# Patient Record
Sex: MALE | Race: WHITE | NOT HISPANIC OR LATINO | Employment: FULL TIME | URBAN - NONMETROPOLITAN AREA
[De-identification: names, ages, dates, MRNs, and addresses within clinical notes are randomized per-mention and may not be internally consistent; named-entity substitution may affect disease eponyms.]

---

## 2017-10-19 ENCOUNTER — TRANSCRIBE ORDERS (OUTPATIENT)
Dept: ADMINISTRATIVE | Facility: HOSPITAL | Age: 62
End: 2017-10-19

## 2017-10-19 ENCOUNTER — LAB (OUTPATIENT)
Dept: LAB | Facility: HOSPITAL | Age: 62
End: 2017-10-19
Attending: OPHTHALMOLOGY

## 2017-10-19 DIAGNOSIS — H47.019 ISCHEMIC OPTIC NEUROPATHY, UNSPECIFIED LATERALITY: ICD-10-CM

## 2017-10-19 DIAGNOSIS — H47.019 ISCHEMIC OPTIC NEUROPATHY, UNSPECIFIED LATERALITY: Primary | ICD-10-CM

## 2017-10-19 LAB
CRP SERPL-MCNC: <0.5 MG/DL (ref 0–0.99)
ERYTHROCYTE [SEDIMENTATION RATE] IN BLOOD: 5 MM/HR (ref 0–20)

## 2017-10-19 PROCEDURE — 86140 C-REACTIVE PROTEIN: CPT | Performed by: OPHTHALMOLOGY

## 2017-10-19 PROCEDURE — 85652 RBC SED RATE AUTOMATED: CPT | Performed by: OPHTHALMOLOGY

## 2017-10-19 PROCEDURE — 36415 COLL VENOUS BLD VENIPUNCTURE: CPT

## 2021-03-23 ENCOUNTER — IMMUNIZATION (OUTPATIENT)
Dept: VACCINE CLINIC | Facility: HOSPITAL | Age: 66
End: 2021-03-23

## 2021-03-23 PROCEDURE — 0001A: CPT | Performed by: INTERNAL MEDICINE

## 2021-03-23 PROCEDURE — 91300 HC SARSCOV02 VAC 30MCG/0.3ML IM: CPT | Performed by: INTERNAL MEDICINE

## 2021-04-13 ENCOUNTER — IMMUNIZATION (OUTPATIENT)
Dept: VACCINE CLINIC | Facility: HOSPITAL | Age: 66
End: 2021-04-13

## 2021-04-13 PROCEDURE — 91300 HC SARSCOV02 VAC 30MCG/0.3ML IM: CPT | Performed by: INTERNAL MEDICINE

## 2021-04-13 PROCEDURE — 0002A: CPT | Performed by: INTERNAL MEDICINE

## 2024-02-02 ENCOUNTER — HOSPITAL ENCOUNTER (INPATIENT)
Facility: HOSPITAL | Age: 69
LOS: 1 days | Discharge: HOME | DRG: 066 | End: 2024-02-03
Attending: INTERNAL MEDICINE | Admitting: INTERNAL MEDICINE
Payer: MEDICARE

## 2024-02-02 ENCOUNTER — APPOINTMENT (OUTPATIENT)
Dept: CT IMAGING | Facility: HOSPITAL | Age: 69
DRG: 066 | End: 2024-02-02
Payer: MEDICARE

## 2024-02-02 ENCOUNTER — HOSPITAL ENCOUNTER (EMERGENCY)
Facility: HOSPITAL | Age: 69
Discharge: OTHER INSTITUTION | DRG: 066 | End: 2024-02-02
Attending: EMERGENCY MEDICINE
Payer: MEDICARE

## 2024-02-02 ENCOUNTER — APPOINTMENT (OUTPATIENT)
Dept: GENERAL RADIOLOGY | Facility: HOSPITAL | Age: 69
DRG: 066 | End: 2024-02-02
Payer: COMMERCIAL

## 2024-02-02 VITALS
DIASTOLIC BLOOD PRESSURE: 110 MMHG | HEIGHT: 71 IN | BODY MASS INDEX: 30.1 KG/M2 | WEIGHT: 215 LBS | TEMPERATURE: 97.8 F | SYSTOLIC BLOOD PRESSURE: 172 MMHG | HEART RATE: 94 BPM | RESPIRATION RATE: 18 BRPM | OXYGEN SATURATION: 96 %

## 2024-02-02 DIAGNOSIS — I10 UNCONTROLLED HYPERTENSION: ICD-10-CM

## 2024-02-02 DIAGNOSIS — I63.9 ACUTE CVA (CEREBROVASCULAR ACCIDENT): Primary | ICD-10-CM

## 2024-02-02 PROBLEM — G45.9 TIA (TRANSIENT ISCHEMIC ATTACK): Status: ACTIVE | Noted: 2024-02-02

## 2024-02-02 LAB
ABO GROUP BLD: NORMAL
ABO GROUP BLD: NORMAL
ALBUMIN SERPL-MCNC: 4.3 G/DL (ref 3.5–5.2)
ALBUMIN/GLOB SERPL: 1.4 G/DL
ALP SERPL-CCNC: 94 U/L (ref 39–117)
ALT SERPL W P-5'-P-CCNC: 40 U/L (ref 1–41)
ANION GAP SERPL CALCULATED.3IONS-SCNC: 10.3 MMOL/L (ref 5–15)
APTT PPP: 28.1 SECONDS (ref 26.5–34.5)
AST SERPL-CCNC: 27 U/L (ref 1–40)
B PARAPERT DNA SPEC QL NAA+PROBE: NOT DETECTED
B PERT DNA SPEC QL NAA+PROBE: NOT DETECTED
BASOPHILS # BLD AUTO: 0.09 10*3/MM3 (ref 0–0.2)
BASOPHILS NFR BLD AUTO: 0.9 % (ref 0–1.5)
BILIRUB SERPL-MCNC: 0.6 MG/DL (ref 0–1.2)
BLD GP AB SCN SERPL QL: NEGATIVE
BUN SERPL-MCNC: 8 MG/DL (ref 8–23)
BUN/CREAT SERPL: 7.9 (ref 7–25)
C PNEUM DNA NPH QL NAA+NON-PROBE: NOT DETECTED
CALCIUM SPEC-SCNC: 9.4 MG/DL (ref 8.6–10.5)
CHLORIDE SERPL-SCNC: 101 MMOL/L (ref 98–107)
CO2 SERPL-SCNC: 26.7 MMOL/L (ref 22–29)
CREAT BLDA-MCNC: 1 MG/DL (ref 0.6–1.3)
CREAT SERPL-MCNC: 1.01 MG/DL (ref 0.76–1.27)
DEPRECATED RDW RBC AUTO: 45.1 FL (ref 37–54)
EGFRCR SERPLBLD CKD-EPI 2021: 82 ML/MIN/1.73
EOSINOPHIL # BLD AUTO: 0.19 10*3/MM3 (ref 0–0.4)
EOSINOPHIL NFR BLD AUTO: 1.9 % (ref 0.3–6.2)
ERYTHROCYTE [DISTWIDTH] IN BLOOD BY AUTOMATED COUNT: 13.2 % (ref 12.3–15.4)
FLUAV SUBTYP SPEC NAA+PROBE: NOT DETECTED
FLUBV RNA ISLT QL NAA+PROBE: NOT DETECTED
GLOBULIN UR ELPH-MCNC: 3 GM/DL
GLUCOSE BLDC GLUCOMTR-MCNC: 129 MG/DL (ref 70–130)
GLUCOSE BLDC GLUCOMTR-MCNC: 175 MG/DL (ref 70–130)
GLUCOSE BLDC GLUCOMTR-MCNC: 178 MG/DL (ref 70–130)
GLUCOSE SERPL-MCNC: 190 MG/DL (ref 65–99)
HADV DNA SPEC NAA+PROBE: NOT DETECTED
HCOV 229E RNA SPEC QL NAA+PROBE: NOT DETECTED
HCOV HKU1 RNA SPEC QL NAA+PROBE: NOT DETECTED
HCOV NL63 RNA SPEC QL NAA+PROBE: NOT DETECTED
HCOV OC43 RNA SPEC QL NAA+PROBE: NOT DETECTED
HCT VFR BLD AUTO: 51.9 % (ref 37.5–51)
HGB BLD-MCNC: 18.2 G/DL (ref 13–17.7)
HMPV RNA NPH QL NAA+NON-PROBE: NOT DETECTED
HOLD SPECIMEN: NORMAL
HOLD SPECIMEN: NORMAL
HPIV1 RNA ISLT QL NAA+PROBE: NOT DETECTED
HPIV2 RNA SPEC QL NAA+PROBE: NOT DETECTED
HPIV3 RNA NPH QL NAA+PROBE: NOT DETECTED
HPIV4 P GENE NPH QL NAA+PROBE: NOT DETECTED
IMM GRANULOCYTES # BLD AUTO: 0.04 10*3/MM3 (ref 0–0.05)
IMM GRANULOCYTES NFR BLD AUTO: 0.4 % (ref 0–0.5)
INR PPP: 0.94 (ref 0.9–1.1)
LYMPHOCYTES # BLD AUTO: 2.46 10*3/MM3 (ref 0.7–3.1)
LYMPHOCYTES NFR BLD AUTO: 24.1 % (ref 19.6–45.3)
M PNEUMO IGG SER IA-ACNC: NOT DETECTED
MCH RBC QN AUTO: 32.6 PG (ref 26.6–33)
MCHC RBC AUTO-ENTMCNC: 35.1 G/DL (ref 31.5–35.7)
MCV RBC AUTO: 93 FL (ref 79–97)
MONOCYTES # BLD AUTO: 0.91 10*3/MM3 (ref 0.1–0.9)
MONOCYTES NFR BLD AUTO: 8.9 % (ref 5–12)
NEUTROPHILS NFR BLD AUTO: 6.52 10*3/MM3 (ref 1.7–7)
NEUTROPHILS NFR BLD AUTO: 63.8 % (ref 42.7–76)
NRBC BLD AUTO-RTO: 0 /100 WBC (ref 0–0.2)
PLATELET # BLD AUTO: 215 10*3/MM3 (ref 140–450)
PMV BLD AUTO: 9.4 FL (ref 6–12)
POTASSIUM SERPL-SCNC: 4.4 MMOL/L (ref 3.5–5.2)
PROT SERPL-MCNC: 7.3 G/DL (ref 6–8.5)
PROTHROMBIN TIME: 13 SECONDS (ref 12.1–14.7)
RBC # BLD AUTO: 5.58 10*6/MM3 (ref 4.14–5.8)
RH BLD: POSITIVE
RH BLD: POSITIVE
RHINOVIRUS RNA SPEC NAA+PROBE: NOT DETECTED
RSV RNA NPH QL NAA+NON-PROBE: NOT DETECTED
SARS-COV-2 RNA NPH QL NAA+NON-PROBE: NOT DETECTED
SODIUM SERPL-SCNC: 138 MMOL/L (ref 136–145)
T&S EXPIRATION DATE: NORMAL
TROPONIN T SERPL HS-MCNC: 8 NG/L
WBC NRBC COR # BLD AUTO: 10.21 10*3/MM3 (ref 3.4–10.8)
WHOLE BLOOD HOLD COAG: NORMAL
WHOLE BLOOD HOLD SPECIMEN: NORMAL

## 2024-02-02 PROCEDURE — 82565 ASSAY OF CREATININE: CPT

## 2024-02-02 PROCEDURE — 84484 ASSAY OF TROPONIN QUANT: CPT | Performed by: EMERGENCY MEDICINE

## 2024-02-02 PROCEDURE — 93005 ELECTROCARDIOGRAM TRACING: CPT | Performed by: EMERGENCY MEDICINE

## 2024-02-02 PROCEDURE — 93010 ELECTROCARDIOGRAM REPORT: CPT | Performed by: INTERNAL MEDICINE

## 2024-02-02 PROCEDURE — 0042T CT CEREBRAL PERFUSION W WO CONTRAST: CPT | Performed by: RADIOLOGY

## 2024-02-02 PROCEDURE — 80053 COMPREHEN METABOLIC PANEL: CPT | Performed by: EMERGENCY MEDICINE

## 2024-02-02 PROCEDURE — 85025 COMPLETE CBC W/AUTO DIFF WBC: CPT | Performed by: EMERGENCY MEDICINE

## 2024-02-02 PROCEDURE — 86850 RBC ANTIBODY SCREEN: CPT | Performed by: EMERGENCY MEDICINE

## 2024-02-02 PROCEDURE — 71045 X-RAY EXAM CHEST 1 VIEW: CPT | Performed by: RADIOLOGY

## 2024-02-02 PROCEDURE — 70496 CT ANGIOGRAPHY HEAD: CPT | Performed by: RADIOLOGY

## 2024-02-02 PROCEDURE — 86900 BLOOD TYPING SEROLOGIC ABO: CPT

## 2024-02-02 PROCEDURE — 99205 OFFICE O/P NEW HI 60 MIN: CPT | Performed by: INTERNAL MEDICINE

## 2024-02-02 PROCEDURE — 70498 CT ANGIOGRAPHY NECK: CPT

## 2024-02-02 PROCEDURE — 99222 1ST HOSP IP/OBS MODERATE 55: CPT | Performed by: INTERNAL MEDICINE

## 2024-02-02 PROCEDURE — 0042T HC CT CEREBRAL PERFUSION W/WO CONTRAST: CPT

## 2024-02-02 PROCEDURE — 99285 EMERGENCY DEPT VISIT HI MDM: CPT

## 2024-02-02 PROCEDURE — 25510000001 IOPAMIDOL PER 1 ML: Performed by: EMERGENCY MEDICINE

## 2024-02-02 PROCEDURE — 86900 BLOOD TYPING SEROLOGIC ABO: CPT | Performed by: EMERGENCY MEDICINE

## 2024-02-02 PROCEDURE — 70496 CT ANGIOGRAPHY HEAD: CPT

## 2024-02-02 PROCEDURE — 71045 X-RAY EXAM CHEST 1 VIEW: CPT

## 2024-02-02 PROCEDURE — 86901 BLOOD TYPING SEROLOGIC RH(D): CPT

## 2024-02-02 PROCEDURE — 0202U NFCT DS 22 TRGT SARS-COV-2: CPT | Performed by: INTERNAL MEDICINE

## 2024-02-02 PROCEDURE — 85610 PROTHROMBIN TIME: CPT | Performed by: EMERGENCY MEDICINE

## 2024-02-02 PROCEDURE — 85730 THROMBOPLASTIN TIME PARTIAL: CPT | Performed by: EMERGENCY MEDICINE

## 2024-02-02 PROCEDURE — 70498 CT ANGIOGRAPHY NECK: CPT | Performed by: RADIOLOGY

## 2024-02-02 PROCEDURE — 86901 BLOOD TYPING SEROLOGIC RH(D): CPT | Performed by: EMERGENCY MEDICINE

## 2024-02-02 PROCEDURE — 82948 REAGENT STRIP/BLOOD GLUCOSE: CPT

## 2024-02-02 PROCEDURE — 70450 CT HEAD/BRAIN W/O DYE: CPT

## 2024-02-02 RX ORDER — ATORVASTATIN CALCIUM 40 MG/1
80 TABLET, FILM COATED ORAL NIGHTLY
Status: DISCONTINUED | OUTPATIENT
Start: 2024-02-02 | End: 2024-02-03

## 2024-02-02 RX ORDER — ASPIRIN 81 MG/1
81 TABLET, CHEWABLE ORAL DAILY
Status: DISCONTINUED | OUTPATIENT
Start: 2024-02-03 | End: 2024-02-03 | Stop reason: HOSPADM

## 2024-02-02 RX ORDER — CLOPIDOGREL BISULFATE 75 MG/1
300 TABLET ORAL ONCE
Status: COMPLETED | OUTPATIENT
Start: 2024-02-02 | End: 2024-02-02

## 2024-02-02 RX ORDER — METOPROLOL SUCCINATE 25 MG/1
25 TABLET, EXTENDED RELEASE ORAL DAILY
COMMUNITY
End: 2024-02-03 | Stop reason: HOSPADM

## 2024-02-02 RX ORDER — SODIUM CHLORIDE 0.9 % (FLUSH) 0.9 %
10 SYRINGE (ML) INJECTION EVERY 12 HOURS SCHEDULED
Status: DISCONTINUED | OUTPATIENT
Start: 2024-02-02 | End: 2024-02-03 | Stop reason: HOSPADM

## 2024-02-02 RX ORDER — ROSUVASTATIN CALCIUM 20 MG/1
20 TABLET, COATED ORAL DAILY
COMMUNITY
End: 2024-02-03 | Stop reason: HOSPADM

## 2024-02-02 RX ORDER — ASPIRIN 300 MG/1
300 SUPPOSITORY RECTAL DAILY
Status: DISCONTINUED | OUTPATIENT
Start: 2024-02-03 | End: 2024-02-03

## 2024-02-02 RX ORDER — ASPIRIN 81 MG/1
324 TABLET, CHEWABLE ORAL ONCE
Status: COMPLETED | OUTPATIENT
Start: 2024-02-02 | End: 2024-02-02

## 2024-02-02 RX ORDER — ONDANSETRON 2 MG/ML
4 INJECTION INTRAMUSCULAR; INTRAVENOUS EVERY 6 HOURS PRN
Status: DISCONTINUED | OUTPATIENT
Start: 2024-02-02 | End: 2024-02-03 | Stop reason: HOSPADM

## 2024-02-02 RX ORDER — CLOPIDOGREL BISULFATE 75 MG/1
75 TABLET ORAL DAILY
Status: DISCONTINUED | OUTPATIENT
Start: 2024-02-03 | End: 2024-02-03 | Stop reason: HOSPADM

## 2024-02-02 RX ORDER — SODIUM CHLORIDE 0.9 % (FLUSH) 0.9 %
10 SYRINGE (ML) INJECTION AS NEEDED
Status: DISCONTINUED | OUTPATIENT
Start: 2024-02-02 | End: 2024-02-02 | Stop reason: HOSPADM

## 2024-02-02 RX ORDER — SODIUM CHLORIDE 9 MG/ML
40 INJECTION, SOLUTION INTRAVENOUS AS NEEDED
Status: DISCONTINUED | OUTPATIENT
Start: 2024-02-02 | End: 2024-02-03 | Stop reason: HOSPADM

## 2024-02-02 RX ORDER — SODIUM CHLORIDE 0.9 % (FLUSH) 0.9 %
10 SYRINGE (ML) INJECTION AS NEEDED
Status: DISCONTINUED | OUTPATIENT
Start: 2024-02-02 | End: 2024-02-03 | Stop reason: HOSPADM

## 2024-02-02 RX ADMIN — Medication 10 ML: at 20:12

## 2024-02-02 RX ADMIN — CLOPIDOGREL BISULFATE 300 MG: 75 TABLET ORAL at 16:24

## 2024-02-02 RX ADMIN — ATORVASTATIN CALCIUM 80 MG: 40 TABLET, FILM COATED ORAL at 20:11

## 2024-02-02 RX ADMIN — ASPIRIN 324 MG: 81 TABLET, CHEWABLE ORAL at 16:24

## 2024-02-02 RX ADMIN — IOPAMIDOL 140 ML: 755 INJECTION, SOLUTION INTRAVENOUS at 13:29

## 2024-02-02 NOTE — SIGNIFICANT NOTE
Patient Name: Jeremias Gallegos   MRN: [id number]  Age: 68 y.o.  Sex: male  : 1955     Primary Care Physician: Nagi Rodriguez MD  Referring Physician:       TIME STROKE TEAM CALLED: 1337 EST     TIME PATIENT SEEN: 1540 EST     Handedness: Right  Race:      Chief Complaint/Reason for Consultation: Left-sided weakness, dysarthria, aphasia     HPI: Jeremias Gallegos is a 68-year-old male with PMH of HTN, HLD, CAD, and peripheral vascular disease presented to [facility] ED with complaints of sudden slurred speech, left facial droop, left arm weakness, and aphasia.  Patient was at a store with friends when symptoms began at approximately 1230 today.  Emergent CT head at OSH demonstrates right frontal hypodensity.  CTA shows a right M1 segment 99% stenosis with flow reconstituting.  CTP demonstrates right MCA penumbra of 158 mL and mismatch ratio 12.3.  Blood pressure at OSH reported as 194/114.  OSH NIH stroke scale 4.  Was deemed not to be a candidate for IV thrombolytics due to noncontrast head CT changes.  [facility] neurology stroke was contacted by Dr. Toussaint for transfer and higher acuity of care.  Of note patient was reportedly on Plavix in the past but has not seen a doctor in over 2 years.      On arrival /110.  NIH 1 on arrival due to sensory deficit. Non-disabling symptoms with patient feeling he was essentially at baseline. Patient was transported directly to ICU for continued monitoring.  Patient states that he does take ASA 81 mg daily but did not take it this morning.  He is on no other anticoagulants or antiplatelet medication.  He is prescribed medication for HTN but states he has been out for approximately 1 month.  He has no current complaint of headache, dizziness, or weakness.  Patient is currently missing his dentures, but feels like his speech is currently at baseline.      Last Known Normal Date/Time: 1230 EST     Review of Systems   Constitutional: Negative.  Negative for activity  change, appetite change, fatigue and fever.   HENT:  Positive for dental problem. Negative for congestion, trouble swallowing and voice change.         Missing dentures (forgot at home)   Eyes: Negative.  Negative for photophobia and visual disturbance.   Respiratory: Negative.  Negative for cough and shortness of breath.    Cardiovascular: Negative.  Negative for chest pain.   Gastrointestinal: Negative.  Negative for diarrhea, nausea and vomiting.   Genitourinary: Negative.  Negative for difficulty urinating.   Musculoskeletal: Negative.    Skin: Negative.    Neurological:  Positive for numbness. Negative for dizziness, tremors, seizures, syncope, facial asymmetry, speech difficulty, weakness, light-headedness and headaches.   Psychiatric/Behavioral: Negative.       Temp:  [97.8 °F (36.6 °C)] 97.8 °F (36.6 °C)  Heart Rate:  [] 94  Resp:  [18] 18  BP: (172-192)/(110-122) 172/110  Neurological Exam  Mental Status  Alert. Oriented to person, place, time and situation. Oriented to person, place, and time. Speech is normal. Language is fluent with no aphasia. Fund of knowledge is appropriate for level of education. Apraxia absent.     Cranial Nerves  CN II: Visual fields full to confrontation.  CN III, IV, VI: Extraocular movements intact bilaterally. Normal lids and orbits bilaterally. Pupils equal round and reactive to light bilaterally.  CN V: Facial sensation is normal.  CN VII:  Right: There is no facial weakness.  Left: There is central facial weakness.  CN XII: Tongue midline without atrophy or fasciculations.     Motor  Normal muscle bulk throughout. No fasciculations present. Normal muscle tone. Strength is 5/5 throughout all four extremities.     Sensory  Light touch abnormality: Patient states his right arm feels different to light touch (diminished). .      Coordination  Right: Finger-to-nose normal.Left: Finger-to-nose normal.     Gait     Not observed.     Physical Exam  Constitutional:        General: He is not in acute distress.     Appearance: He is obese. He is not ill-appearing.   HENT:      Head: Normocephalic and atraumatic.      Mouth/Throat:      Mouth: Mucous membranes are moist.      Pharynx: Oropharynx is clear.   Eyes:      General: Lids are normal.      Extraocular Movements: Extraocular movements intact.      Pupils: Pupils are equal, round, and reactive to light.   Cardiovascular:      Rate and Rhythm: Normal rate.   Pulmonary:      Effort: Pulmonary effort is normal. No respiratory distress.      Breath sounds: No stridor.   Abdominal:      General: There is no distension.      Tenderness: There is no guarding.   Musculoskeletal:         General: No tenderness or signs of injury.      Right lower leg: No edema.      Left lower leg: No edema.   Skin:     General: Skin is warm and dry.      Findings: No bruising.   Neurological:      Mental Status: He is alert and oriented to person, place, and time.      Cranial Nerves: Cranial nerve deficit present.      Sensory: Sensory deficit present.      Motor: Motor strength is normal.No weakness.      Coordination: Coordination normal.   Psychiatric:         Speech: Speech normal.     Interval:  (admit to ICU)  1a. Level of Consciousness: 0-->Alert, keenly responsive  1b. LOC Questions: 0-->Answers both questions correctly  1c. LOC Commands: 0-->Performs both tasks correctly  2. Best Gaze: 0-->Normal  3. Visual: 0-->No visual loss  4. Facial Palsy: 0-->Normal symmetrical movements  5a. Motor Arm, Left: 0-->No drift, limb holds 90 (or 45) degrees for full 10 secs  5b. Motor Arm, Right: 0-->No drift, limb holds 90 (or 45) degrees for full 10 secs  6a. Motor Leg, Left: 0-->No drift, leg holds 30 degree position for full 5 secs  6b. Motor Leg, Right: 0-->No drift, leg holds 30 degree position for full 5 secs  7. Limb Ataxia: 0-->Absent  8. Sensory: 1-->Mild-to-moderate sensory loss, patient feels pinprick is less sharp or is dull on the affected side,  "or there is a loss of superficial pain with pinprick, but patient is aware of being touched  9. Best Language: 0-->No aphasia, normal  10. Dysarthria: 0-->Normal  11. Extinction and Inattention (formerly Neglect): 0-->No abnormality    Total (NIH Stroke Scale): 1     CBC w/diff          2/2/2024    13:10   CBC w/Diff   WBC 10.21    RBC 5.58    Hemoglobin 18.2    Hematocrit 51.9    MCV 93.0    MCH 32.6    MCHC 35.1    RDW 13.2    Platelets 215    Neutrophil Rel % 63.8    Immature Granulocyte Rel % 0.4    Lymphocyte Rel % 24.1    Monocyte Rel % 8.9    Eosinophil Rel % 1.9    Basophil Rel % 0.9         Basic Metabolic Panel    Sodium Sodium   Date Value Ref Range Status   02/02/2024 138 136 - 145 mmol/L Final      Potassium Potassium   Date Value Ref Range Status   02/02/2024 4.4 3.5 - 5.2 mmol/L Final     Comment:     Slight hemolysis detected by analyzer. Result may be falsely elevated.      Chloride Chloride   Date Value Ref Range Status   02/02/2024 101 98 - 107 mmol/L Final      Bicarbonate No results found for: \"PLASMABICARB\"   BUN BUN   Date Value Ref Range Status   02/02/2024 8 8 - 23 mg/dL Final      Creatinine Creatinine   Date Value Ref Range Status   02/02/2024 1.01 0.76 - 1.27 mg/dL Final   02/02/2024 1.00 0.60 - 1.30 mg/dL Final     Comment:     Serial Number: [id number]:  [id number]      Calcium Calcium   Date Value Ref Range Status   02/02/2024 9.4 8.6 - 10.5 mg/dL Final      Glucose      No components found for: \"GLUCOSE.*\"      XR Chest 1 View    Result Date: 2/2/2024    Unremarkable exam. No acute cardiopulmonary findings identified.   This report was finalized on 2/2/2024 2:22 PM by Dr. Bubba Lopez MD.      CT Angiogram Head w AI Analysis of LVO    Result Date: 2/2/2024  1.  Focal short segment 99% stenosis versus occlusion of the distal right M1 MCA segment.  Flow is noted distal to this. 2.  A small focal high-grade stenotic lesion of right M2 MCA segment. 3.  Short segment high-grade stenosis " basilar artery at the level of petrous apex. 4.  Moderate calcifications intracranial right ICA segment without high-grade stenosis. Moderate stenosis clinoid segment. 5.  Moderate diffuse plaque of the intracranial left ICA segments with moderate stenosis clinoid segment. No high-grade stenosis.   This report was finalized on 2/2/2024 2:04 PM by Dr. Bubba Lopez MD.      CT CEREBRAL PERFUSION WITH & WITHOUT CONTRAST    Result Date: 2/2/2024  Acute brain perfusion abnormality in the right MCA territory with penumbra volume: 158 mL. Mismatch ratio: 12.3   This report was finalized on 2/2/2024 2:02 PM by Dr. Bubba Lopez MD.      CT Angiogram Neck    Result Date: 2/2/2024  1.  Focal short segment high-grade stenosis of the basilar artery without evidence of occlusion. 2.  There is advanced plaque of the proximal left ICA and carotid bulb with moderate 55% medium segment stenosis. No high-grade stenosis or occlusion. 3.  Moderate plaque origin of left CCA with moderate 50% stenosis short segment. 4.  Aberrant origin of the right subclavian artery coursing posterior to the esophagus. 5. Other incidental and nonacute findings detailed above.   This report was finalized on 2/2/2024 1:57 PM by Dr. Bubba Lopez MD.      CT Head Without Contrast Stroke Protocol    Result Date: 2/2/2024  1.  Subacute appearing infarct in the right frontal lobe without edema or mass effect. 2.  No intracranial hemorrhage. 3.  Moderate chronic small vessel ischemic disease. 4.  Chronic lacunar infarct left basal ganglionic region.   This report was finalized on 2/2/2024 1:17 PM by Dr. Bubba Lopez MD.          Assessment/Plan:   68-year-old male with PMH of HTN, HLD, CAD, and peripheral vascular disease presented to [facility] ED with complaints of sudden slurred speech, left facial droop, left arm weakness, and aphasia.  LKW 1230 today.  Emergent CT head at OSH demonstrates right frontal hypodensity.  CTA shows a right M1  segment 99% stenosis with flow reconstituting.  CTP demonstrates right MCA penumbra of 158 mL and mismatch ratio 12.3.  Blood pressure at OSH reported as 194/114.  OSH NIH stroke scale 4.  Was deemed not to be a candidate for IV thrombolytics due to noncontrast head CT changes.  [facility] neurology stroke was contacted by Dr. Toussaint for transfer and higher acuity of care.  On arrival /110.  NIH 1 on arrival due to sensory deficit. Non-disabling symptoms with patient feeling he was essentially at baseline. Not a candidate for TNK on arrival based on non-disabling symptoms and non-con CT changes. Patient was transported directly to ICU for continued monitoring.       Antiplatelet PTA: ASA 81 mg  Anticoagulant PTA: none        Dysarthria, aphasia, left-sided weakness  -99% right M1 stenosis with subacute right frontal lobe infarct in right MCA perfusion abnormalities  -TIA/CVA without IV thrombolytic order set in place  -MRI pending  -TTE pending  -N.p.o. until patient passes dysphagia screen.  Healthy cardiac diet if patient passes screening  -Activity as tolerated  -PT/OT/SLP to evaluate  -Patient loaded with aspirin 325 mg and Plavix 300 mg on arrival at [facility]  -Start aspirin 81 mg daily and Plavix 75 mg daily starting 2/3/24  -Consider neurointervention if patient fails medical management   -Please call neurostroke with any significant neurological decline     2 HTN  -Allow permissive hypertension to ensure adequate perfusion  -Avoid hypotension , strict blood pressure monitoring  -Please call if systolic blood pressure greater than 220  -Titrate with Cardene drip  -Management per hospital medical team     3.  HLD  -Start atorvastatin 80 mg nightly  -FLP with a.m. labs  -LDL goal less than 70     Neurology stroke will continue to follow.  Plan of care discussed with patient, Dr. Flores, and hospital medical team.      Nadeem Brooks PA-C  February 2, 2024  14:31 EST

## 2024-02-02 NOTE — ED NOTES
Pt departing the ER en route to [facility], Pt leaving A&OX4, RR even and unlabored, skin WPD, noted to be NSR on the monitor. Pt leaving with NIH 0 at time of departure. Pt leaving with #20g to right ac clean, dry, intact, saline locked. Pt leaving not appearing in any acute distress. Safety measures remain WDL.

## 2024-02-02 NOTE — PLAN OF CARE
Goal Outcome Evaluation:  Plan of Care Reviewed With: patient           Outcome Evaluation: NIH 1 upon arrival to ICU for decrease sensation on RUE, Pt reports now has resolved. NSR with PVCs. VSS. RA. Passed bedside swallow. Good appepite. no BM. ADequate UOP. Friends at bedside. Updated and questions answered

## 2024-02-02 NOTE — CONSULTS
[facility]   Teleneurology Note    Patient Name: Jeremias Gallegos  : 1955  MRN: [id number]  Primary Care Physician: Nagi Rodriguez MD  Referring Site: [facility]  Location of Neurologist: [facility]    Subjective   Teleneurology Initial Data           Neurologist Evaluation Date: 24     Date Last Known Well: 24 Time Last Known Well: 1230     History     Chief Complaint: Sudden onset left-sided weakness slurred speech last known well at 12:30 p.m. no family members present found by the friends.  HPI: 68-year-old gentleman nonmuscle medical history available possibly CAD as well as peripheral vascular disease was with his friends with a local store had sudden onset left-sided slurred speech as well as left-sided facial droop lasted for 1-2 minutes got better and then again he had immediately left-sided face drooping slurred speech aphasia dysarthria and left arm weakness was brought in has a stroke alert.  No further history is were available.  ED doctors trying to get hold of the family stroke alert was called in.    Stroke Risk Factors/ Pertinent Data           Scoring Scales     Intracerebral Hemmorhage (ICH) Score  Age>=80: no   NIH is performed 1133  a.m[facility]  NIH Stroke Scale     NIHSS Performed Date: 24  1133 MST     Interval: baseline  1a. Level of Consciousness: 0-->Alert, keenly responsive  1b. LOC Questions: 0-->Answers both questions correctly  1c. LOC Commands: 0-->Performs both tasks correctly  2. Best Gaze: 0-->Normal  3. Visual: 0-->No visual loss  4. Facial Palsy: 2-->Partial paralysis (total or near-total paralysis of lower face)  5a. Motor Arm, Left: 1-->Drift, limb holds 90 (or 45) degrees, but drifts down before full 10 seconds, does not hit bed or other support  5b. Motor Arm, Right: 0-->No drift, limb holds 90 (or 45) degrees for full 10 secs  6a. Motor Leg, Left: 0-->No drift, leg holds 30 degree position for full 5 secs  6b. Motor Leg, Right: 0-->No drift, leg holds 30 degree  position for full 5 secs  7. Limb Ataxia: 1-->Present in one limb  8. Sensory: 1-->Mild-to-moderate sensory loss, patient feels pinprick is less sharp or is dull on the affected side, or there is a loss of superficial pain with pinprick, but patient is aware of being touched  9. Best Language: 1-->Mild-to-moderate aphasia, some obvious loss of fluency or facility of comprehension, without significant limitation on ideas expressed or form of expression. Reduction of speech and/or comprehension, however, makes conversation. . . (see row details)  10. Dysarthria: 0-->Normal  11. Extinction and Inattention (formerly Neglect): 0-->No abnormality  Total (NIH Stroke Scale): 4     Review of Systems     Review of Systems    Objective   Exam     Exam performed with the help of support staff from the referring site  Neurological Exam    Result Review    Results          Personal review of CNS imaging:(Official report by radiologist pending)  Imaging  CT Imaging Review: CT Imaging reviewed, NO acute infarct/ hemorrhage seen  CTA Imaging Review: CTA Imaging reviewed, POSITIVE for large vessel occlusion or stenosis    Thrombolytic   Thrombolytics: thrombolytic not given     Assessment & Plan   Assessment/ Plan     Assessment:  Acute Stroke Evaluation: Suspected ACUTE ischemic stroke      CT head shows evolving right frontal stroke which matches his symptoms.  Not a candidate for thrombolysis at this point because of high-risk of bleeding explained to the patient discussed with the ED doctor.  Plan:  Stroke evaluation/treatment:  Vitals and monitoring:  - High frequency vital signs and neurochecks  -  q4 hours x 24 hours    - Continuous cardiac monitoring and telemetry    Blood pressure management:    -Permissive hypertension, treat BP if SBP > 220 or DBP > 120 then lower BP by 15% within the first 24 hrs.    Imaging:  -MRI brain without contrast  if not then repeat non-contrast head CT in 48-72 hours would be reasonable to assess  for evolving ischemic infarct  -PRN head CT without contrast for neurologic decline    Vascular study:  -CTA head/neck  shows right-sided stenosis M1 segment versus clot spoke with ED doctor to get opinion from neuro IR immediately    Cardiac ECHO:  -Transthoracic Echocardiogram (TTE) with PFO assessment  Inpatient telemetry negative recommend loop recorder    Labs:  -Fasting Lipid panel for LDL   -HgbA1C   - TSH, Vit B12, Folate,     Antithrombotic:  -Start Aspirin  325 mg OR Aspirin 300mg PA if unable to take PO  --  Load with Plavix 600 mg right now tomorrow onwards Plavix 75 and aspirin 81    Statin:  -Start Lipitor 40 mg    Therapy:  -PT/OT/ST evaluation and        -Ensure bedside dysphagia screen is completed  -Assess for IP rehab needs      Communication: Plan communicated with bedside provider    Thank you for the opportunity to assist in the care of this patient.  For questions/concerns, please page out to me via the [employer] secure messaging system.         Disposition     Disposition: The patient will remain at the referring institution for further evaluation and management    Medical Decision Making  Medical Data Reviewed: Data reviewed including: clinical labs, radiology and/or medical tests, Obtaining/ reviewing old medical records  Length of visit: 30 minutes    IHodan MD, saw the patient on 02/02/24 at   for an initial in-patient or emergency room telememedicine face to face consult using interactive technology for 30 minutes. The location of the patient was [place]. I was located at [place].    I have proceeded with this evaluation at the request of the referring practitioner as it is felt to be an emergency setting and no appropriate specialist is available to perform this evaluation. The [facility] hospital has reported that this is the correct patient and has obtained consent from the patient/surrogate to perform this telemedicine evaluation(including obtaining history,  performing examination and reviewing data provided by the patient an/or originating site of care provider)    I have introduced myself to the patient, provided my credentials, disclosed my location, and determined that, based on review of the patient's chart and discussion with the patient's primary team, telemedicine via a HIPAA compliant, real-time, face-to-face two-way, interactive audio and video platform is an appropriate and effective means of providing the service.    The patient/surrogate has a right to refuse this evaluation as they have been explained risks including potential loss of confidentiality, benefits, alternatives, and the potential need for subsequent face-to-face care. In this evaluation, we will be providing recommendations only.  The ultimate decision to follow or not to follow these recommendations will be left to the bedside treating/requesting practitioner.    The patient/surrogate has been notified that other healthcare professionals including technical person may be involved in this A/V evaluation.  All laws concerning confidentiality and patient access to medical records and copies of medical records apply to telemedicine.  The patient/surrogate has received the originating site's Health Notice of Privacy Practices.    Hodan Sofia MD

## 2024-02-02 NOTE — ED NOTES
Pt gave verbal consent to fly via [employer] to [facility].

## 2024-02-02 NOTE — CONSULTS
Reviewed chart for diabetes education consult.  Noted no history of diabetes.  Noted no A1c available, pending.  Noted no medication at home for diabetes.  Will continue to follow for diabetes education needs.  Thank you for this referral.

## 2024-02-02 NOTE — ED NOTES
Called [facility] for possible transport to [facility]  79 accepted transfer  10 eta

## 2024-02-02 NOTE — ED PROVIDER NOTES
Subjective   History of Present Illness  68-year-old white male presents for with speech problem.  History is per patient and his friends.  They were at a local motorcycle shop when the friends noted the patient had left facial droop and slurred speech.  He was confused.  This lasted for just a few minutes, and then resolved spontaneously.  However, symptoms returned within another few minutes and has been symptomatic continuously over the past 25 minutes.  He denied any previous history of CVA.  He does have a history of hypertension, but does not been to the doctor in 2 years and has not taken his medicines.,  including blood pressure medicine and Plavix.      Review of Systems   All other systems reviewed and are negative.      Past Medical History:   Diagnosis Date    Arthritis     Elevated cholesterol     GERD (gastroesophageal reflux disease)     Sleep apnea     Stroke        No Known Allergies    Past Surgical History:   Procedure Laterality Date    CARDIAC CATHETERIZATION      EYE SURGERY      VASCULAR SURGERY         No family history on file.    Social History     Socioeconomic History    Marital status: Single   Tobacco Use    Smoking status: Former     Types: Cigarettes     Quit date: 2022     Years since quittin.0    Smokeless tobacco: Current     Types: Chew   Vaping Use    Vaping Use: Never used   Substance and Sexual Activity    Alcohol use: Yes     Alcohol/week: 2.0 standard drinks of alcohol     Types: 2 Cans of beer per week    Drug use: Never    Sexual activity: Defer           Objective   Physical Exam  Vitals and nursing note reviewed. Exam conducted with a chaperone present.   Constitutional:       Appearance: Normal appearance. He is normal weight.   HENT:      Head: Normocephalic and atraumatic.      Mouth/Throat:      Mouth: Mucous membranes are moist.      Pharynx: Oropharynx is clear.   Eyes:      Extraocular Movements: Extraocular movements intact.      Pupils: Pupils are equal,  round, and reactive to light.   Cardiovascular:      Rate and Rhythm: Normal rate and regular rhythm.      Heart sounds: Normal heart sounds. No murmur heard.     No friction rub. No gallop.   Pulmonary:      Effort: Pulmonary effort is normal. No respiratory distress.      Breath sounds: Normal breath sounds. No wheezing, rhonchi or rales.   Chest:      Chest wall: No tenderness.   Abdominal:      General: Abdomen is flat. Bowel sounds are normal. There is no distension.      Palpations: Abdomen is soft.      Tenderness: There is no abdominal tenderness.   Musculoskeletal:         General: Normal range of motion.      Right lower leg: No edema.      Left lower leg: No edema.   Skin:     General: Skin is warm and dry.   Neurological:      Mental Status: He is alert and oriented to person, place, and time.   Psychiatric:         Mood and Affect: Mood normal.         Behavior: Behavior normal.       Results for orders placed or performed during the hospital encounter of 02/02/24   Comprehensive Metabolic Panel    Specimen: Blood   Result Value Ref Range    Glucose 190 (H) 65 - 99 mg/dL    BUN 8 8 - 23 mg/dL    Creatinine 1.01 0.76 - 1.27 mg/dL    Sodium 138 136 - 145 mmol/L    Potassium 4.4 3.5 - 5.2 mmol/L    Chloride 101 98 - 107 mmol/L    CO2 26.7 22.0 - 29.0 mmol/L    Calcium 9.4 8.6 - 10.5 mg/dL    Total Protein 7.3 6.0 - 8.5 g/dL    Albumin 4.3 3.5 - 5.2 g/dL    ALT (SGPT) 40 1 - 41 U/L    AST (SGOT) 27 1 - 40 U/L    Alkaline Phosphatase 94 39 - 117 U/L    Total Bilirubin 0.6 0.0 - 1.2 mg/dL    Globulin 3.0 gm/dL    A/G Ratio 1.4 g/dL    BUN/Creatinine Ratio 7.9 7.0 - 25.0    Anion Gap 10.3 5.0 - 15.0 mmol/L    eGFR 82.0 >60.0 mL/min/1.73   Protime-INR    Specimen: Blood   Result Value Ref Range    Protime 13.0 12.1 - 14.7 Seconds    INR 0.94 0.90 - 1.10   aPTT    Specimen: Blood   Result Value Ref Range    PTT 28.1 26.5 - 34.5 seconds   Single High Sensitivity Troponin T    Specimen: Blood   Result Value Ref  Range    HS Troponin T 8 <22 ng/L   CBC Auto Differential    Specimen: Blood   Result Value Ref Range    WBC 10.21 3.40 - 10.80 10*3/mm3    RBC 5.58 4.14 - 5.80 10*6/mm3    Hemoglobin 18.2 (H) 13.0 - 17.7 g/dL    Hematocrit 51.9 (H) 37.5 - 51.0 %    MCV 93.0 79.0 - 97.0 fL    MCH 32.6 26.6 - 33.0 pg    MCHC 35.1 31.5 - 35.7 g/dL    RDW 13.2 12.3 - 15.4 %    RDW-SD 45.1 37.0 - 54.0 fl    MPV 9.4 6.0 - 12.0 fL    Platelets 215 140 - 450 10*3/mm3    Neutrophil % 63.8 42.7 - 76.0 %    Lymphocyte % 24.1 19.6 - 45.3 %    Monocyte % 8.9 5.0 - 12.0 %    Eosinophil % 1.9 0.3 - 6.2 %    Basophil % 0.9 0.0 - 1.5 %    Immature Grans % 0.4 0.0 - 0.5 %    Neutrophils, Absolute 6.52 1.70 - 7.00 10*3/mm3    Lymphocytes, Absolute 2.46 0.70 - 3.10 10*3/mm3    Monocytes, Absolute 0.91 (H) 0.10 - 0.90 10*3/mm3    Eosinophils, Absolute 0.19 0.00 - 0.40 10*3/mm3    Basophils, Absolute 0.09 0.00 - 0.20 10*3/mm3    Immature Grans, Absolute 0.04 0.00 - 0.05 10*3/mm3    nRBC 0.0 0.0 - 0.2 /100 WBC   POC Glucose Once    Specimen: Blood   Result Value Ref Range    Glucose 178 (H) 70 - 130 mg/dL   POC Creatinine    Specimen: Blood   Result Value Ref Range    Creatinine 1.00 0.60 - 1.30 mg/dL   Type & Screen    Specimen: Blood   Result Value Ref Range    ABO Type O     RH type Positive     Antibody Screen Negative     T&S Expiration Date 2/5/2024 11:59:59 PM    ABO RH Specimen Verification    Specimen: Blood   Result Value Ref Range    ABO Type O     RH type Positive    Green Top (Gel)   Result Value Ref Range    Extra Tube Hold for add-ons.    Lavender Top   Result Value Ref Range    Extra Tube hold for add-on    Gold Top - SST   Result Value Ref Range    Extra Tube Hold for add-ons.    Light Blue Top   Result Value Ref Range    Extra Tube Hold for add-ons.      XR Chest 1 View    Result Date: 2/2/2024  Narrative: EXAM:   XR Chest, 1 View  EXAM DATE:   2/2/2024 1:32 PM  CLINICAL HISTORY:   Acute Stroke Protocol (Onset < 12 hrs)  TECHNIQUE:    Frontal view of the chest.  COMPARISON:   2/25/2014  FINDINGS:   Lungs and pleural spaces:  Unremarkable as visualized.  No consolidation.  No pneumothorax.   Heart:  Unremarkable as visualized.  No cardiomegaly.   Mediastinum:  Unremarkable as visualized.   Bones/joints:  Unremarkable as visualized.      Impression:   Unremarkable exam. No acute cardiopulmonary findings identified.   This report was finalized on 2/2/2024 2:22 PM by Dr. Bubba Lopez MD.      CT Angiogram Head w AI Analysis of LVO    Result Date: 2/2/2024  Narrative: EXAM:   CT Angiography Head With Intravenous Contrast  EXAM DATE:   2/2/2024 1:07 PM  CLINICAL HISTORY:   Neuro deficit, acute stroke suspected  TECHNIQUE:   Axial computed tomographic angiography images of the head with intravenous contrast. LVO analysis was performed with [email or web address] software.  This CT exam was performed using one or more of the following dose reduction techniques:  automated exposure control, adjustment of the mA and/or kV according to patient size, and/or use of iterative reconstruction technique.   MIP reconstructed images were created and reviewed.  COMPARISON:   No relevant prior studies available.  FINDINGS:   Right internal carotid artery:  Moderate calcifications intracranial right ICA segment without high-grade stenosis. Moderate stenosis clinoid segment.  No aneurysm.   Right anterior cerebral artery:  Unremarkable as visualized.  No occlusion or significant stenosis.  No aneurysm.   Right middle cerebral artery:  Focal short segment 99% stenosis versus occlusion of the distal right M1 MCA segment.  A small focal high-grade stenotic lesion of right M2 MCA segment.  No aneurysm.   Right posterior cerebral artery:  Right P-comm supplies the right P2 PCA segment.  No occlusion or significant stenosis.  No aneurysm.   Right vertebral artery:  Right vertebral artery terminates in an inferior cerebellar branches.    Left internal carotid artery:  Moderate diffuse  plaque of the intracranial left ICA segments with moderate stenosis clinoid segment. No high-grade stenosis.  No aneurysm.   Left anterior cerebral artery:  Unremarkable as visualized.  No occlusion or significant stenosis.  No aneurysm.   Left middle cerebral artery:  Unremarkable as visualized.  No occlusion or significant stenosis.  No aneurysm.   Left posterior cerebral artery:  Unremarkable as visualized.  No occlusion or significant stenosis.  No aneurysm.   Left vertebral artery:  Left vertebral artery dominance.    Basilar artery:  Short segment high-grade stenosis basilar artery at the level of petrous apex.  No aneurysm.      Impression: 1.  Focal short segment 99% stenosis versus occlusion of the distal right M1 MCA segment.  Flow is noted distal to this. 2.  A small focal high-grade stenotic lesion of right M2 MCA segment. 3.  Short segment high-grade stenosis basilar artery at the level of petrous apex. 4.  Moderate calcifications intracranial right ICA segment without high-grade stenosis. Moderate stenosis clinoid segment. 5.  Moderate diffuse plaque of the intracranial left ICA segments with moderate stenosis clinoid segment. No high-grade stenosis.   This report was finalized on 2/2/2024 2:04 PM by Dr. Bubba Lopez MD.      CT CEREBRAL PERFUSION WITH & WITHOUT CONTRAST    Result Date: 2/2/2024  Narrative: EXAMINATION: CT CEREBRAL PERFUSION W WO CONTRAST-  CLINICAL INDICATION:Neuro deficit, acute, stroke suspected  COMPARISON: None  TECHNIQUE: Axial computed tomography images of the brain without and with intravenous contrast using cerebral perfusion protocol. Post-processing parametric maps were created using [email or web address] - [employer] software and reviewed.  Sagittal and coronal reformatted images were created and reviewed.  This CT exam was performed using one or more of the following dose reduction techniques:  automated exposure control, adjustment of the mA and/or kV according to patient size, and/or use of  iterative reconstruction technique.  FINDINGS:  Arterial input function is optimal.  PERFUSION PARAMETERS: Ischemic tissue volume (Tmax > 6 sec.): 172 mL. Infarct core volume (CBF < 30%): 14 mL. Mismatch (penumbra) volume: 158 mL. Mismatch ratio: 12.3 Location/territory: Right MCA territory.  COLLATERAL CIRCULATION PARAMETERS: Volume poor collateral perfusion (Tmax > 10 sec.): 95 mL. Hypoperfusion index (Tmax >10 sec./Tmax > 6 sec.): 0.6 CBV Index (rCBV in Tmax > 6 sec. region): 0.7  OTHER: No additional remarkable findings.      Impression: Acute brain perfusion abnormality in the right MCA territory with penumbra volume: 158 mL. Mismatch ratio: 12.3   This report was finalized on 2/2/2024 2:02 PM by Dr. Bubba Lopez MD.      CT Angiogram Neck    Result Date: 2/2/2024  Narrative: EXAM:   CT Angiography Neck With Intravenous Contrast  EXAM DATE:   2/2/2024 1:08 PM  CLINICAL HISTORY:   Neuro deficit, acute stroke suspected  TECHNIQUE:   Axial computed tomographic angiography images of the neck with intravenous contrast.  This CT exam was performed using one or more of the following dose reduction techniques:  automated exposure control, adjustment of the mA and/or kV according to patient size, and/or use of iterative reconstruction technique.   MIP reconstructed images were created and reviewed.  COMPARISON:   None.  FINDINGS:   VASCULATURE:   Right common carotid artery:  Moderate diffuse plaque with intimal thickening right CCA without significant stenosis or occlusion.   Right internal carotid artery:  Moderate plaque proximal right ICA/carotid bulb with no significant stenosis or occlusion.   Right external carotid artery:  Unremarkable as visualized.  No occlusion.   Right vertebral artery:  Right vertebral artery terminates into right inferior cerebellar branches.  No occlusion or significant stenosis.  No dissection.    Left common carotid artery:  Moderate intimal thickening and soft plaque left CCA  diffusely without significant stenosis or occlusion. Moderate plaque origin of left CCA with moderate 50% stenosis short segment.   Left internal carotid artery:  There is advanced plaque of the proximal left ICA and carotid bulb with moderate 55% medium segment stenosis. No high-grade stenosis or occlusion.   Left external carotid artery:  Unremarkable as visualized.  No occlusion.   Left vertebral artery:  Left dominant vertebral artery system.  No occlusion or significant stenosis.  No dissection.   Basilar artery:  Focal short segment high-grade stenosis of the basilar artery without evidence of occlusion.   Brachiocephalic and subclavian arteries:  Aberrant origin of the right subclavian artery coursing posterior to the esophagus.   NECK:   Bones/joints:  Unremarkable as visualized.   Soft tissues:  Unremarkable as visualized.   Lung apices: Emphysema. Chronic interstitial fibrosis..   CAROTID STENOSIS REFERENCE USING NASCET CRITERIA:   % ICA stenosis = (1 - narrowest ICA diameter/diameter of distal cervical ICA) x 100.   Mild - <50% stenosis.   Moderate - 50-69% stenosis.   Severe - 70-94% stenosis.   Near occlusion - 95-99% stenosis.   Occluded - 100% stenosis.      Impression: 1.  Focal short segment high-grade stenosis of the basilar artery without evidence of occlusion. 2.  There is advanced plaque of the proximal left ICA and carotid bulb with moderate 55% medium segment stenosis. No high-grade stenosis or occlusion. 3.  Moderate plaque origin of left CCA with moderate 50% stenosis short segment. 4.  Aberrant origin of the right subclavian artery coursing posterior to the esophagus. 5. Other incidental and nonacute findings detailed above.   This report was finalized on 2/2/2024 1:57 PM by Dr. Bubba Lopez MD.      CT Head Without Contrast Stroke Protocol    Result Date: 2/2/2024  Narrative: EXAM:   CT Head Without Intravenous Contrast  EXAM DATE:   2/2/2024 1:03 PM  CLINICAL HISTORY:   Neuro deficit,  acute, stroke suspected  TECHNIQUE:   Axial computed tomography images of the head/brain without intravenous contrast.  Sagittal and coronal reformatted images were created and reviewed.  This CT exam was performed using one or more of the following dose reduction techniques:  automated exposure control, adjustment of the mA and/or kV according to patient size, and/or use of iterative reconstruction technique.  COMPARISON:   No relevant prior studies available.  FINDINGS:   Brain and extra-axial spaces:  Subacute appearing infarct in the right frontal lobe without edema or mass effect.  Moderate chronic small vessel ischemic disease.  Chronic lacunar infarct left basal ganglionic region.  No intracranial hemorrhage.   Bones/joints:  Unremarkable as visualized.  No acute fracture.   Soft tissues:  Unremarkable as visualized.   Sinuses:  Unremarkable as visualized.  No acute sinusitis.   Mastoid air cells:  Unremarkable as visualized.  No mastoid effusion.      Impression: 1.  Subacute appearing infarct in the right frontal lobe without edema or mass effect. 2.  No intracranial hemorrhage. 3.  Moderate chronic small vessel ischemic disease. 4.  Chronic lacunar infarct left basal ganglionic region.   This report was finalized on 2/2/2024 1:17 PM by Dr. Bubba Lopez MD.         Procedures           ED Course  ED Course as of 02/02/24 1836 Fri Feb 02, 2024   1321 Discussed with Dr. Pride.  Patient not candidate for thrombolytics, due to subacute CVA and risk for bleeding.  He recommends transfer for possible interventional radiology.  Patient accepted in transfer at [facility], Dr. Linder. [BC]   1836 EKG at 1417 normal sinus rhythm.  Rate 95.  Left axis deviation.  Incomplete right bundle branch block.  No acute ST elevation or depression.  Abnormal EKG.  Interpreted by me.  Electronically signed by Nam Castro MD, 02/02/24, 6:36 PM EST.   [BC]      ED Course User Index  [BC] Nam Castro MD                           Total (NIH Stroke Scale): 5                  Medical Decision Making  Problems Addressed:  Acute CVA (cerebrovascular accident): complicated acute illness or injury  Uncontrolled hypertension: complicated acute illness or injury    Amount and/or Complexity of Data Reviewed  Labs: ordered.  Radiology: ordered.  ECG/medicine tests: ordered.    Risk  Prescription drug management.        Final diagnoses:   Acute CVA (cerebrovascular accident)   Uncontrolled hypertension       ED Disposition  ED Disposition       ED Disposition   Transfer to Another Facility     Condition   --    Comment   --               No follow-up provider specified.       Medication List      No changes were made to your prescriptions during this visit.            Nam Castro MD  02/02/24 6301       Nam Castro MD  02/02/24 [id number]

## 2024-02-02 NOTE — H&P
INTENSIVIST   PROGRESS NOTE        SUBJECTIVE     Jeremias 68 y.o. male is followed for: No chief complaint on file.       * No active hospital problems. *    As an Intensivist, we provide an integrated approach to the ICU patient and family, medical management of comorbid conditions, including but not limited to electrolytes, glycemic control, organ dysfunction, lead interdisciplinary rounds and coordinate the care with all other services, including those from other specialists.     Interval History:  After 12:00 noon he had a brief episode of slurred speech, then 10 minutes later he had another episode, which lasted longer, and associated to weakness on the left side and aphasia.    He was taken to the ED at [facility] by his family/friends. His initial BP was 194/114.    He does not recall what happened.    Further discussions between the ED attending and the Stroke Team, they decided he was not a candidate for thrombolytics, and proceeded to transfer here to continue his evaluation.    When I saw him in 2B his speech was normal and there was not weakness. He still had some sensory loss.    Temp  Min: 97.8 °F (36.6 °C)  Max: 98.6 °F (37 °C)       History     Last Reviewed by Bryan Wilson MD on 2/2/2024 at  4:05 PM    Sections Reviewed    Medical, Surgical, Family, Tobacco, Alcohol, Drug Use, Sexual Activity,   Social Documentation    Problem list reviewed by Bryan Wilson MD on 2/2/2024 at  4:05 PM  Medicines reviewed by Bryan Wilson MD on 2/2/2024 at  4:05 PM  Allergies reviewed by Bryan Wilson MD on 2/2/2024 at  4:05 PM       The patient's relevant past medical, surgical and social history were reviewed and updated in Epic as appropriate.     PMH: He  has a past medical history of Arthritis, Elevated cholesterol, GERD (gastroesophageal reflux disease), Sleep apnea, and Stroke.   PSH: He  has a past surgical history that includes Cardiac catheterization; Eye surgery; and Vascular surgery.       Medications:  Current Facility-Administered Medications on File Prior to Encounter   Medication    [COMPLETED] iopamidol (ISOVUE-370) 76 % injection 100 mL    [DISCONTINUED] sodium chloride 0.9 % flush 10 mL     Current Outpatient Medications on File Prior to Encounter   Medication Sig    metoprolol succinate XL (TOPROL-XL) 25 MG 24 hr tablet Take 1 tablet by mouth Daily.    rosuvastatin (CRESTOR) 20 MG tablet Take 1 tablet by mouth Daily.       Allergies: He has No Known Allergies.   FH: His family history is not on file.   SH: He  reports that he quit smoking about 2 years ago. His smoking use included cigarettes. His smokeless tobacco use includes chew. He reports current alcohol use of about 2.0 standard drinks of alcohol per week. He reports that he does not use drugs.     ROS:  As per HPI       OBJECTIVE     Vitals:  Temp: 98.6 °F (37 °C) (24 1607) Temp  Min: 97.8 °F (36.6 °C)  Max: 98.6 °F (37 °C)   Temp core:      BP: 149/89 (24 1730) BP  Min: 134/103  Max: 192/114   MAP (non-invasive) Noninvasive MAP (mmHg): 118 (24 1730) Noninvasive MAP (mmHg)  Av.6  Min: 1  Max: 157   Pulse: 92 (24 1730) Pulse  Min: 81  Max: 104   Resp: 16 (24 1607) Resp  Min: 16  Max: 18   SpO2: 95 % (24 1730) SpO2  Min: 95 %  Max: 100 %   Device: room air (24 1800)    Flow Rate:   No data recorded         24  160   Weight: 91.1 kg (200 lb 13.4 oz)        Intake/Ouptut 24 hrs (7:00AM - 6:59 AM)  Intake & Output (last 2 days)          07 0700  07 07 07 0700    Urine (mL/kg/hr)   550    Total Output   550    Net   -550                   Medications (drips):  niCARdipine, Last Rate: Stopped (24 1546)        Physical Examination  Telemetry:  Rhythm: normal sinus rhythm (24 1800)         Constitutional:  No acute distress.   Cardiovascular: RRR.    Respiratory: Normal breath sounds  No adventitious sounds   Abdominal:  Soft  with no tenderness.   Extremities: No Edema   Neurological:   Alert, Oriented, Cooperative.  Best Eye Response: 4-->(E4) spontaneous (02/02/24 1800)  Best Motor Response: 6-->(M6) obeys commands (02/02/24 1800)  Best Verbal Response: 5-->(V5) oriented (02/02/24 1800)  [place] Coma Scale Score: 15 (02/02/24 1800)     NIH Stroke Scale  Interval:  (admit to ICU) (02/02/24 1533)  1a. Level of Consciousness: 0-->Alert, keenly responsive (02/02/24 1533)  1b. LOC Questions: 0-->Answers both questions correctly (02/02/24 1533)  1c. LOC Commands: 0-->Performs both tasks correctly (02/02/24 1533)  2. Best Gaze: 0-->Normal (02/02/24 1533)  3. Visual: 0-->No visual loss (02/02/24 1533)  4. Facial Palsy: 0-->Normal symmetrical movements (02/02/24 1533)  5a. Motor Arm, Left: 0-->No drift, limb holds 90 (or 45) degrees for full 10 secs (02/02/24 1533)  5b. Motor Arm, Right: 0-->No drift, limb holds 90 (or 45) degrees for full 10 secs (02/02/24 1533)  6a. Motor Leg, Left: 0-->No drift, leg holds 30 degree position for full 5 secs (02/02/24 1533)  6b. Motor Leg, Right: 0-->No drift, leg holds 30 degree position for full 5 secs (02/02/24 1533)  7. Limb Ataxia: 0-->Absent (02/02/24 1533)  8. Sensory: 1-->Mild-to-moderate sensory loss, patient feels pinprick is less sharp or is dull on the affected side, or there is a loss of superficial pain with pinprick, but patient is aware of being touched (02/02/24 1533)  9. Best Language: 0-->No aphasia, normal (02/02/24 1533)  10. Dysarthria: 0-->Normal (02/02/24 1533)  11. Extinction and Inattention (formerly Neglect): 0-->No abnormality (02/02/24 1533)  Total (NIH Stroke Scale): 1 (02/02/24 1533)    Results Reviewed:  Laboratory  Microbiology  Radiology  Pathology    Hematology:  Results from last 7 days   Lab Units 02/02/24  1310   WBC 10*3/mm3 10.21   HEMOGLOBIN g/dL 18.2*   MCV fL 93.0   PLATELETS 10*3/mm3 215     Results from last 7 days   Lab Units 02/02/24  1310   NEUTROS ABS 10*3/mm3  "6.52   LYMPHS ABS 10*3/mm3 2.46   EOS ABS 10*3/mm3 0.19     Chemistry:  Estimated Creatinine Clearance: 83.9 mL/min (by C-G formula based on SCr of 1 mg/dL).    Results from last 7 days   Lab Units 02/02/24  1310   SODIUM mmol/L 138   POTASSIUM mmol/L 4.4   CHLORIDE mmol/L 101   CO2 mmol/L 26.7   BUN mg/dL 8   CREATININE mg/dL 1.00  1.01   GLUCOSE mg/dL 190*     Results from last 7 days   Lab Units 02/02/24  1310   CALCIUM mg/dL 9.4       Hepatic Panel:  Results from last 7 days   Lab Units 02/02/24  1310   ALBUMIN g/dL 4.3   TOTAL PROTEIN g/dL 7.3   BILIRUBIN mg/dL 0.6   AST (SGOT) U/L 27   ALT (SGPT) U/L 40   ALK PHOS U/L 94            Coagulation Labs:  Results from last 7 days   Lab Units 02/02/24  1310   PROTIME Seconds 13.0   INR  0.94   APTT seconds 28.1        Cardiac Labs:  Results from last 7 days   Lab Units 02/02/24  1310   HSTROP T ng/L 8     COVID-19  No results found for: \"COVID19\"    Images:  XR Chest 1 View    Result Date: 2/2/2024    Unremarkable exam. No acute cardiopulmonary findings identified.   This report was finalized on 2/2/2024 2:22 PM by Dr. Bubba Lopez MD.      CT Angiogram Head w AI Analysis of LVO    Result Date: 2/2/2024  1.  Focal short segment 99% stenosis versus occlusion of the distal right M1 MCA segment.  Flow is noted distal to this. 2.  A small focal high-grade stenotic lesion of right M2 MCA segment. 3.  Short segment high-grade stenosis basilar artery at the level of petrous apex. 4.  Moderate calcifications intracranial right ICA segment without high-grade stenosis. Moderate stenosis clinoid segment. 5.  Moderate diffuse plaque of the intracranial left ICA segments with moderate stenosis clinoid segment. No high-grade stenosis.   This report was finalized on 2/2/2024 2:04 PM by Dr. Bubba Lopez MD.      CT CEREBRAL PERFUSION WITH & WITHOUT CONTRAST    Result Date: 2/2/2024  Acute brain perfusion abnormality in the right MCA territory with penumbra volume: 158 mL. " Mismatch ratio: 12.3   This report was finalized on 2/2/2024 2:02 PM by Dr. Bubba Lopez MD.      CT Angiogram Neck    Result Date: 2/2/2024  1.  Focal short segment high-grade stenosis of the basilar artery without evidence of occlusion. 2.  There is advanced plaque of the proximal left ICA and carotid bulb with moderate 55% medium segment stenosis. No high-grade stenosis or occlusion. 3.  Moderate plaque origin of left CCA with moderate 50% stenosis short segment. 4.  Aberrant origin of the right subclavian artery coursing posterior to the esophagus. 5. Other incidental and nonacute findings detailed above.   This report was finalized on 2/2/2024 1:57 PM by Dr. Bubba Lopez MD.      CT Head Without Contrast Stroke Protocol    Result Date: 2/2/2024  1.  Subacute appearing infarct in the right frontal lobe without edema or mass effect. 2.  No intracranial hemorrhage. 3.  Moderate chronic small vessel ischemic disease. 4.  Chronic lacunar infarct left basal ganglionic region.   This report was finalized on 2/2/2024 1:17 PM by Dr. Bubba Lopez MD.       Echo:      Results: Reviewed.    I reviewed the patient's new laboratory and imaging results.  I independently reviewed the patient's new images.    Medications: Reviewed.    Assessment   A/P     Hospital:  LOS: 0 days   ICU: 3h     There are no active hospital problems to display for this patient.    Jeremias is a 68 y.o. male admitted on 2/2/2024 with Dysarthria, left-sided weakness    Assessment/Management/Treatment Plan:    R/O Subacute infarct Right Frontal Lobe/R MCA territory.  Focal short segment 99% stenosis vs  occlusion of the distal R M1 MCA Segment.  Moderate chronic small vessel ischemic disease.  Chronic lacunar infarct left basal ganglia  DAPT as per Stroke Team  High grade stenosis of the basilar artery without evidence of occlusion.  Left carotid disease. Plaque. 55% stenosis as per CTA Neck  Cardiovascular  HTN. He has been off his medicines for  about 2 months. Spot checks showed a SBP ~ 170 mm Hg.  Dyslipidemia   Quit smoking 2 years ago. But using other nicotine products.  Endocrine  Body mass index is 28.01 kg/m². Overweight: 25.0-29.9kg/m2   Prediabetes (A1C 5.7%-6.4%)    Lab Results   Lab Value Date/Time    HGBA1C 6.1 (H) 02/25/2014 1445     Results from last 7 days   Lab Units 02/02/24  1656 02/02/24  1252   GLUCOSE mg/dL 129 178*         Diet: Diet: Cardiac Diets; Healthy Heart (2-3 Na+); Texture: Regular Texture (IDDSI 7); Fluid Consistency: Thin (IDDSI 0)  No active supplement orders      Advance Directives: There are no questions and answers to display.        DVT prophylaxis:  Mechanical DVT prophylaxis orders are present.         In brief:    Acute ischemic stroke  Monitor NIHSS  Monitor for arrhythmias  Parenteral antihypertensives prn  MRI  FLP  High-intensity statin - Long term goal LDL < 70.  Goal: Glucose < 180 mg/dL.  HbA1c (done)  ECHO with agitated saline study  Keep SpO2 > 94%  PT/OT/SLT  Smoking cessation if applicable.  Discussed with patient and family at bedside.  Check SARS-CoV-2 PCR   Disposition: Admit to ICU    Plan of care and goals reviewed during interdisciplinary rounds.  I discussed the patient's findings and my recommendations with patient, family, and nursing staff    MDM:    Problem(s) High due to: Acute or Chronic illness or injury that may poses a threat to life or bodily function  Data: Moderate due to: Review of prior external records from each unique source, Review or results of each unique test, and Ordering of each unique test    Moderate      [x] Primary Attending Intensive Care Medicine - Nutrition Support   [] Consultant

## 2024-02-03 ENCOUNTER — APPOINTMENT (OUTPATIENT)
Dept: MRI IMAGING | Facility: HOSPITAL | Age: 69
DRG: 066 | End: 2024-02-03
Payer: MEDICARE

## 2024-02-03 ENCOUNTER — APPOINTMENT (OUTPATIENT)
Dept: CARDIOLOGY | Facility: HOSPITAL | Age: 69
DRG: 066 | End: 2024-02-03
Payer: COMMERCIAL

## 2024-02-03 ENCOUNTER — READMISSION MANAGEMENT (OUTPATIENT)
Dept: CALL CENTER | Facility: HOSPITAL | Age: 69
End: 2024-02-03
Payer: MEDICARE

## 2024-02-03 VITALS
TEMPERATURE: 98.2 F | HEART RATE: 92 BPM | RESPIRATION RATE: 16 BRPM | HEIGHT: 71 IN | BODY MASS INDEX: 28 KG/M2 | WEIGHT: 200 LBS | OXYGEN SATURATION: 93 % | SYSTOLIC BLOOD PRESSURE: 111 MMHG | DIASTOLIC BLOOD PRESSURE: 94 MMHG

## 2024-02-03 DIAGNOSIS — I69.30 SEQUELAE, POST-STROKE: Primary | ICD-10-CM

## 2024-02-03 PROBLEM — I63.511 ACUTE ISCHEMIC RIGHT MCA STROKE: Status: ACTIVE | Noted: 2024-02-02

## 2024-02-03 LAB
ALBUMIN SERPL-MCNC: 3.9 G/DL (ref 3.5–5.2)
ALBUMIN/GLOB SERPL: 1.5 G/DL
ALP SERPL-CCNC: 79 U/L (ref 39–117)
ALT SERPL W P-5'-P-CCNC: 33 U/L (ref 1–41)
ANION GAP SERPL CALCULATED.3IONS-SCNC: 14 MMOL/L (ref 5–15)
AST SERPL-CCNC: 23 U/L (ref 1–40)
BASOPHILS # BLD AUTO: 0.09 10*3/MM3 (ref 0–0.2)
BASOPHILS NFR BLD AUTO: 0.9 % (ref 0–1.5)
BH CV ECHO MEAS - AO MAX PG: 2.9 MMHG
BH CV ECHO MEAS - AO MEAN PG: 1 MMHG
BH CV ECHO MEAS - AO ROOT DIAM: 2.9 CM
BH CV ECHO MEAS - AO V2 MAX: 84.7 CM/SEC
BH CV ECHO MEAS - AO V2 VTI: 15.9 CM
BH CV ECHO MEAS - AVA(I,D): 3 CM2
BH CV ECHO MEAS - EDV(CUBED): 59.3 ML
BH CV ECHO MEAS - EDV(MOD-SP2): 50 ML
BH CV ECHO MEAS - EDV(MOD-SP4): 83.2 ML
BH CV ECHO MEAS - EF(MOD-BP): 63.5 %
BH CV ECHO MEAS - EF(MOD-SP2): 64 %
BH CV ECHO MEAS - EF(MOD-SP4): 63.1 %
BH CV ECHO MEAS - ESV(CUBED): 17.6 ML
BH CV ECHO MEAS - ESV(MOD-SP2): 18 ML
BH CV ECHO MEAS - ESV(MOD-SP4): 30.7 ML
BH CV ECHO MEAS - FS: 33.3 %
BH CV ECHO MEAS - IVS/LVPW: 1 CM
BH CV ECHO MEAS - IVSD: 1 CM
BH CV ECHO MEAS - LA DIMENSION: 3.4 CM
BH CV ECHO MEAS - LAT PEAK E' VEL: 7.2 CM/SEC
BH CV ECHO MEAS - LV DIASTOLIC VOL/BSA (35-75): 39.5 CM2
BH CV ECHO MEAS - LV MASS(C)D: 122.1 GRAMS
BH CV ECHO MEAS - LV MAX PG: 2.9 MMHG
BH CV ECHO MEAS - LV MEAN PG: 1 MMHG
BH CV ECHO MEAS - LV SYSTOLIC VOL/BSA (12-30): 14.6 CM2
BH CV ECHO MEAS - LV V1 MAX: 85.4 CM/SEC
BH CV ECHO MEAS - LV V1 VTI: 15.3 CM
BH CV ECHO MEAS - LVIDD: 3.9 CM
BH CV ECHO MEAS - LVIDS: 2.6 CM
BH CV ECHO MEAS - LVOT AREA: 3.1 CM2
BH CV ECHO MEAS - LVOT DIAM: 2 CM
BH CV ECHO MEAS - LVPWD: 1 CM
BH CV ECHO MEAS - MED PEAK E' VEL: 6.1 CM/SEC
BH CV ECHO MEAS - MV A MAX VEL: 88.6 CM/SEC
BH CV ECHO MEAS - MV DEC SLOPE: 139 CM/SEC2
BH CV ECHO MEAS - MV DEC TIME: 0.3 SEC
BH CV ECHO MEAS - MV E MAX VEL: 65.5 CM/SEC
BH CV ECHO MEAS - MV E/A: 0.74
BH CV ECHO MEAS - MV MAX PG: 3.2 MMHG
BH CV ECHO MEAS - MV MEAN PG: 1 MMHG
BH CV ECHO MEAS - MV P1/2T: 146 MSEC
BH CV ECHO MEAS - MV V2 VTI: 31.5 CM
BH CV ECHO MEAS - MVA(P1/2T): 1.51 CM2
BH CV ECHO MEAS - MVA(VTI): 1.53 CM2
BH CV ECHO MEAS - PA ACC TIME: 0.11 SEC
BH CV ECHO MEAS - SI(MOD-SP2): 15.2 ML/M2
BH CV ECHO MEAS - SI(MOD-SP4): 24.9 ML/M2
BH CV ECHO MEAS - SV(LVOT): 48.1 ML
BH CV ECHO MEAS - SV(MOD-SP2): 32 ML
BH CV ECHO MEAS - SV(MOD-SP4): 52.5 ML
BH CV ECHO MEAS - TAPSE (>1.6): 2.5 CM
BH CV ECHO MEASUREMENTS AVERAGE E/E' RATIO: 9.85
BH CV ECHO SHUNT ASSESSMENT PERFORMED (HIDDEN SCRIPTING): 1
BH CV XLRA - RV BASE: 3.6 CM
BH CV XLRA - RV LENGTH: 7 CM
BH CV XLRA - RV MID: 2.7 CM
BH CV XLRA - TDI S': 17.2 CM/SEC
BH CV XLRA MEAS LEFT DIST CCA EDV: 22.5 CM/SEC
BH CV XLRA MEAS LEFT DIST CCA PSV: 92.7 CM/SEC
BH CV XLRA MEAS LEFT DIST ICA EDV: 24.1 CM/SEC
BH CV XLRA MEAS LEFT DIST ICA PSV: 70.2 CM/SEC
BH CV XLRA MEAS LEFT ICA/CCA RATIO: 1.25
BH CV XLRA MEAS LEFT MID CCA EDV: 18.1 CM/SEC
BH CV XLRA MEAS LEFT MID CCA PSV: 77.4 CM/SEC
BH CV XLRA MEAS LEFT MID ICA EDV: 26.3 CM/SEC
BH CV XLRA MEAS LEFT MID ICA PSV: 95.5 CM/SEC
BH CV XLRA MEAS LEFT PROX CCA EDV: 16.5 CM/SEC
BH CV XLRA MEAS LEFT PROX CCA PSV: 74.1 CM/SEC
BH CV XLRA MEAS LEFT PROX ECA EDV: 11.8 CM/SEC
BH CV XLRA MEAS LEFT PROX ECA PSV: 90.7 CM/SEC
BH CV XLRA MEAS LEFT PROX ICA EDV: 17 CM/SEC
BH CV XLRA MEAS LEFT PROX ICA PSV: 66.4 CM/SEC
BH CV XLRA MEAS LEFT PROX SCLA PSV: 114 CM/SEC
BH CV XLRA MEAS LEFT VERTEBRAL A EDV: 8.2 CM/SEC
BH CV XLRA MEAS LEFT VERTEBRAL A PSV: 30.7 CM/SEC
BH CV XLRA MEAS RIGHT DIST CCA EDV: 18.9 CM/SEC
BH CV XLRA MEAS RIGHT DIST CCA PSV: 79.5 CM/SEC
BH CV XLRA MEAS RIGHT DIST ICA EDV: 14.1 CM/SEC
BH CV XLRA MEAS RIGHT DIST ICA PSV: 39.8 CM/SEC
BH CV XLRA MEAS RIGHT ICA/CCA RATIO: 0.74
BH CV XLRA MEAS RIGHT MID CCA EDV: 17.6 CM/SEC
BH CV XLRA MEAS RIGHT MID CCA PSV: 76.8 CM/SEC
BH CV XLRA MEAS RIGHT MID ICA EDV: 21.2 CM/SEC
BH CV XLRA MEAS RIGHT MID ICA PSV: 50.2 CM/SEC
BH CV XLRA MEAS RIGHT PROX CCA EDV: 13.6 CM/SEC
BH CV XLRA MEAS RIGHT PROX CCA PSV: 68 CM/SEC
BH CV XLRA MEAS RIGHT PROX ECA EDV: 16.8 CM/SEC
BH CV XLRA MEAS RIGHT PROX ECA PSV: 98.1 CM/SEC
BH CV XLRA MEAS RIGHT PROX ICA EDV: 10.2 CM/SEC
BH CV XLRA MEAS RIGHT PROX ICA PSV: 57.2 CM/SEC
BH CV XLRA MEAS RIGHT PROX SCLA PSV: 148 CM/SEC
BH CV XLRA MEAS RIGHT VERTEBRAL A EDV: 4.5 CM/SEC
BH CV XLRA MEAS RIGHT VERTEBRAL A PSV: 21.4 CM/SEC
BILIRUB SERPL-MCNC: 0.6 MG/DL (ref 0–1.2)
BUN SERPL-MCNC: 10 MG/DL (ref 8–23)
BUN/CREAT SERPL: 12.2 (ref 7–25)
CALCIUM SPEC-SCNC: 8.9 MG/DL (ref 8.6–10.5)
CHLORIDE SERPL-SCNC: 104 MMOL/L (ref 98–107)
CHOLEST SERPL-MCNC: 192 MG/DL (ref 0–200)
CO2 SERPL-SCNC: 20 MMOL/L (ref 22–29)
CREAT SERPL-MCNC: 0.82 MG/DL (ref 0.76–1.27)
DEPRECATED RDW RBC AUTO: 44.1 FL (ref 37–54)
EGFRCR SERPLBLD CKD-EPI 2021: 96.9 ML/MIN/1.73
EOSINOPHIL # BLD AUTO: 0.26 10*3/MM3 (ref 0–0.4)
EOSINOPHIL NFR BLD AUTO: 2.5 % (ref 0.3–6.2)
ERYTHROCYTE [DISTWIDTH] IN BLOOD BY AUTOMATED COUNT: 12.9 % (ref 12.3–15.4)
GLOBULIN UR ELPH-MCNC: 2.6 GM/DL
GLUCOSE BLDC GLUCOMTR-MCNC: 127 MG/DL (ref 70–130)
GLUCOSE BLDC GLUCOMTR-MCNC: 158 MG/DL (ref 70–130)
GLUCOSE BLDC GLUCOMTR-MCNC: 189 MG/DL (ref 70–130)
GLUCOSE SERPL-MCNC: 164 MG/DL (ref 65–99)
HCT VFR BLD AUTO: 51 % (ref 37.5–51)
HDLC SERPL-MCNC: 28 MG/DL (ref 40–60)
HGB BLD-MCNC: 17.7 G/DL (ref 13–17.7)
IMM GRANULOCYTES # BLD AUTO: 0.05 10*3/MM3 (ref 0–0.05)
IMM GRANULOCYTES NFR BLD AUTO: 0.5 % (ref 0–0.5)
LDLC SERPL CALC-MCNC: 127 MG/DL (ref 0–100)
LDLC/HDLC SERPL: 4.38 {RATIO}
LEFT ATRIUM VOLUME INDEX: 11.6 ML/M2
LYMPHOCYTES # BLD AUTO: 2.01 10*3/MM3 (ref 0.7–3.1)
LYMPHOCYTES NFR BLD AUTO: 19 % (ref 19.6–45.3)
MAGNESIUM SERPL-MCNC: 2.2 MG/DL (ref 1.6–2.4)
MCH RBC QN AUTO: 32.1 PG (ref 26.6–33)
MCHC RBC AUTO-ENTMCNC: 34.7 G/DL (ref 31.5–35.7)
MCV RBC AUTO: 92.6 FL (ref 79–97)
MONOCYTES # BLD AUTO: 1.06 10*3/MM3 (ref 0.1–0.9)
MONOCYTES NFR BLD AUTO: 10 % (ref 5–12)
NEUTROPHILS NFR BLD AUTO: 67.1 % (ref 42.7–76)
NEUTROPHILS NFR BLD AUTO: 7.1 10*3/MM3 (ref 1.7–7)
NRBC BLD AUTO-RTO: 0 /100 WBC (ref 0–0.2)
PHOSPHATE SERPL-MCNC: 2.8 MG/DL (ref 2.5–4.5)
PLATELET # BLD AUTO: 193 10*3/MM3 (ref 140–450)
PMV BLD AUTO: 9.3 FL (ref 6–12)
POTASSIUM SERPL-SCNC: 4.1 MMOL/L (ref 3.5–5.2)
PROT SERPL-MCNC: 6.5 G/DL (ref 6–8.5)
QT INTERVAL: 370 MS
QTC INTERVAL: 464 MS
RBC # BLD AUTO: 5.51 10*6/MM3 (ref 4.14–5.8)
SODIUM SERPL-SCNC: 138 MMOL/L (ref 136–145)
TRIGL SERPL-MCNC: 207 MG/DL (ref 0–150)
TSH SERPL DL<=0.05 MIU/L-ACNC: 2.19 UIU/ML (ref 0.27–4.2)
VLDLC SERPL-MCNC: 37 MG/DL (ref 5–40)
WBC NRBC COR # BLD AUTO: 10.57 10*3/MM3 (ref 3.4–10.8)

## 2024-02-03 PROCEDURE — 84100 ASSAY OF PHOSPHORUS: CPT | Performed by: INTERNAL MEDICINE

## 2024-02-03 PROCEDURE — 93306 TTE W/DOPPLER COMPLETE: CPT

## 2024-02-03 PROCEDURE — 80053 COMPREHEN METABOLIC PANEL: CPT | Performed by: INTERNAL MEDICINE

## 2024-02-03 PROCEDURE — 93880 EXTRACRANIAL BILAT STUDY: CPT | Performed by: INTERNAL MEDICINE

## 2024-02-03 PROCEDURE — 93880 EXTRACRANIAL BILAT STUDY: CPT

## 2024-02-03 PROCEDURE — 97165 OT EVAL LOW COMPLEX 30 MIN: CPT

## 2024-02-03 PROCEDURE — 83735 ASSAY OF MAGNESIUM: CPT | Performed by: INTERNAL MEDICINE

## 2024-02-03 PROCEDURE — 85025 COMPLETE CBC W/AUTO DIFF WBC: CPT | Performed by: INTERNAL MEDICINE

## 2024-02-03 PROCEDURE — 84443 ASSAY THYROID STIM HORMONE: CPT | Performed by: INTERNAL MEDICINE

## 2024-02-03 PROCEDURE — 82948 REAGENT STRIP/BLOOD GLUCOSE: CPT

## 2024-02-03 PROCEDURE — 99238 HOSP IP/OBS DSCHRG MGMT 30/<: CPT | Performed by: INTERNAL MEDICINE

## 2024-02-03 PROCEDURE — 93306 TTE W/DOPPLER COMPLETE: CPT | Performed by: INTERNAL MEDICINE

## 2024-02-03 PROCEDURE — 70551 MRI BRAIN STEM W/O DYE: CPT

## 2024-02-03 PROCEDURE — 80061 LIPID PANEL: CPT

## 2024-02-03 RX ORDER — ROSUVASTATIN CALCIUM 20 MG/1
40 TABLET, COATED ORAL NIGHTLY
Status: DISCONTINUED | OUTPATIENT
Start: 2024-02-04 | End: 2024-02-03 | Stop reason: HOSPADM

## 2024-02-03 RX ORDER — ROSUVASTATIN CALCIUM 20 MG/1
40 TABLET, COATED ORAL NIGHTLY
Status: DISCONTINUED | OUTPATIENT
Start: 2024-02-03 | End: 2024-02-03

## 2024-02-03 RX ORDER — ASPIRIN 81 MG/1
81 TABLET, CHEWABLE ORAL DAILY
Qty: 90 TABLET | Refills: 0 | Status: SHIPPED | OUTPATIENT
Start: 2024-02-04

## 2024-02-03 RX ORDER — CLOPIDOGREL BISULFATE 75 MG/1
75 TABLET ORAL DAILY
Qty: 30 TABLET | Refills: 1 | Status: SHIPPED | OUTPATIENT
Start: 2024-02-04

## 2024-02-03 RX ORDER — ROSUVASTATIN CALCIUM 40 MG/1
40 TABLET, COATED ORAL NIGHTLY
Qty: 90 TABLET | Refills: 0 | Status: SHIPPED | OUTPATIENT
Start: 2024-02-04

## 2024-02-03 RX ADMIN — CLOPIDOGREL BISULFATE 75 MG: 75 TABLET ORAL at 08:28

## 2024-02-03 RX ADMIN — ASPIRIN 81 MG: 81 TABLET, CHEWABLE ORAL at 08:28

## 2024-02-03 NOTE — PLAN OF CARE
Goal Outcome Evaluation:  Plan of Care Reviewed With: patient        Progress: no change  Outcome Evaluation: Pt presents at baseline for ADL completion and related mobility with symmetrical BUE strength and coordination/sensation intact. OT signing off, please reconsult if needed. Rec d/c to home when medically appropriate.      Anticipated Discharge Disposition (OT): home

## 2024-02-03 NOTE — PROGRESS NOTES
INTENSIVIST   PROGRESS NOTE        SUBJECTIVE     Jeremias 68 y.o. male is followed for: No chief complaint on file.       [place] [facility]    As an Intensivist, we provide an integrated approach to the ICU patient and family, medical management of comorbid conditions, including but not limited to electrolytes, glycemic control, organ dysfunction, lead interdisciplinary rounds and coordinate the care with all other services, including those from other specialists.     Interval History:  Doing well. Almost back to normal.    Temp  Min: 97.7 °F (36.5 °C)  Max: 98.6 °F (37 °C)       History     Last Reviewed by Bryan Wilson MD on 2024 at  4:05 PM    Sections Reviewed    Medical, Surgical, Family, Tobacco, Alcohol, Drug Use, Sexual Activity,   Social Documentation    Problem list reviewed by Bryan Wilson MD on 2024 at  4:05 PM  Medicines reviewed by Bryan Wilson MD on 2024 at  4:05 PM  Allergies reviewed by Bryan Wilson MD on 2024 at  4:05 PM     The patient's relevant past medical, surgical and social history were reviewed and updated in Epic as appropriate.      OBJECTIVE     Vitals:  Temp: 98.2 °F (36.8 °C) (24 0800) Temp  Min: 97.7 °F (36.5 °C)  Max: 98.6 °F (37 °C)   Temp core:      BP: 147/94 (24 1300) BP  Min: 116/67  Max: 171/126   MAP (non-invasive) Noninvasive MAP (mmHg): 127 (24 1300) Noninvasive MAP (mmHg)  Av.7  Min: 1  Max: 157   Pulse: 82 (24 1300) Pulse  Min: 57  Max: 92   Resp: 16 (24 0800) Resp  Min: 16  Max: 16   SpO2: 93 % (24 1300) SpO2  Min: 88 %  Max: 99 %   Device: room air (24 0400)    Flow Rate:   No data recorded     Medications (drips):  niCARdipine, Last Rate: Stopped (24 1546)        Physical Examination  Telemetry:  Rhythm: normal sinus rhythm (24 1400)         Constitutional:  No acute distress.   Cardiovascular: RRR.    Respiratory: Normal breath sounds  No adventitious sounds   Abdominal:  Soft with no  tenderness.   Extremities: No Edema   Neurological:   Alert, Oriented, Cooperative.  Best Eye Response: 4-->(E4) spontaneous (02/03/24 1400)  Best Motor Response: 6-->(M6) obeys commands (02/03/24 1400)  Best Verbal Response: 5-->(V5) oriented (02/03/24 1400)  [place] Coma Scale Score: 15 (02/03/24 1400)     Total (NIH Stroke Scale): 0 (02/03/24 0700)     Results Reviewed:  Laboratory  Microbiology  Radiology  Pathology    Hematology:  Results from last 7 days   Lab Units 02/03/24  0617 02/02/24  1310   WBC 10*3/mm3 10.57 10.21   HEMOGLOBIN g/dL 17.7 18.2*   MCV fL 92.6 93.0   PLATELETS 10*3/mm3 193 215     Results from last 7 days   Lab Units 02/03/24  0617 02/02/24  1310   NEUTROS ABS 10*3/mm3 7.10* 6.52   LYMPHS ABS 10*3/mm3 2.01 2.46   EOS ABS 10*3/mm3 0.26 0.19     Chemistry:  Estimated Creatinine Clearance: 102.3 mL/min (by C-G formula based on SCr of 0.82 mg/dL).    Results from last 7 days   Lab Units 02/03/24  0617 02/02/24  1310   SODIUM mmol/L 138 138   POTASSIUM mmol/L 4.1 4.4   CHLORIDE mmol/L 104 101   CO2 mmol/L 20.0* 26.7   BUN mg/dL 10 8   CREATININE mg/dL 0.82 1.00  1.01   GLUCOSE mg/dL 164* 190*     Results from last 7 days   Lab Units 02/03/24  0617 02/02/24  1310   CALCIUM mg/dL 8.9 9.4   MAGNESIUM mg/dL 2.2  --    PHOSPHORUS mg/dL 2.8  --        Hepatic Panel:  Results from last 7 days   Lab Units 02/03/24  0617 02/02/24  1310   ALBUMIN g/dL 3.9 4.3   TOTAL PROTEIN g/dL 6.5 7.3   BILIRUBIN mg/dL 0.6 0.6   AST (SGOT) U/L 23 27   ALT (SGPT) U/L 33 40   ALK PHOS U/L 79 94     Results from last 7 days   Lab Units 02/03/24  0617   CHOLESTEROL mg/dL 192   TRIGLYCERIDES mg/dL 207*   HDL CHOL mg/dL 28*   LDL CHOL mg/dL 127*      COVID-19  Lab Results   Component Value Date    COVID19 Not Detected 02/02/2024     Images:  MRI Brain Without Contrast    Result Date: 2/3/2024  Impression: There are small sites of restricted diffusion in the right MCA distribution largest involving the frontal lobe. This is  consistent with acute to subacute infarct. Moderate amount of hyperintense FLAIR and T2 signal intensity in the periventricular subcortical white matter. This is nonspecific, but most commonly seen with chronic small vessel ischemic change. Electronically Signed: Claudia Curtis MD  2/3/2024 8:59 AM EST  Workstation ID: [id number]    XR Chest 1 View    Result Date: 2/2/2024    Unremarkable exam. No acute cardiopulmonary findings identified.   This report was finalized on 2/2/2024 2:22 PM by Dr. Bubba Lopez MD.      CT Angiogram Head w AI Analysis of LVO    Result Date: 2/2/2024  1.  Focal short segment 99% stenosis versus occlusion of the distal right M1 MCA segment.  Flow is noted distal to this. 2.  A small focal high-grade stenotic lesion of right M2 MCA segment. 3.  Short segment high-grade stenosis basilar artery at the level of petrous apex. 4.  Moderate calcifications intracranial right ICA segment without high-grade stenosis. Moderate stenosis clinoid segment. 5.  Moderate diffuse plaque of the intracranial left ICA segments with moderate stenosis clinoid segment. No high-grade stenosis.   This report was finalized on 2/2/2024 2:04 PM by Dr. Bubba Lopez MD.      CT CEREBRAL PERFUSION WITH & WITHOUT CONTRAST    Result Date: 2/2/2024  Acute brain perfusion abnormality in the right MCA territory with penumbra volume: 158 mL. Mismatch ratio: 12.3   This report was finalized on 2/2/2024 2:02 PM by Dr. Bubba Lopez MD.      CT Angiogram Neck    Result Date: 2/2/2024  1.  Focal short segment high-grade stenosis of the basilar artery without evidence of occlusion. 2.  There is advanced plaque of the proximal left ICA and carotid bulb with moderate 55% medium segment stenosis. No high-grade stenosis or occlusion. 3.  Moderate plaque origin of left CCA with moderate 50% stenosis short segment. 4.  Aberrant origin of the right subclavian artery coursing posterior to the esophagus. 5. Other incidental and nonacute  findings detailed above.   This report was finalized on 2/2/2024 1:57 PM by Dr. Bubba Lopez MD.      CT Head Without Contrast Stroke Protocol    Result Date: 2/2/2024  1.  Subacute appearing infarct in the right frontal lobe without edema or mass effect. 2.  No intracranial hemorrhage. 3.  Moderate chronic small vessel ischemic disease. 4.  Chronic lacunar infarct left basal ganglionic region.   This report was finalized on 2/2/2024 1:17 PM by Dr. Bubba Lopez MD.       Echo:  Results for orders placed during the hospital encounter of 02/02/24    Adult Transthoracic Echo Complete W/ Cont if Necessary Per Protocol (With Agitated Saline)    Interpretation Summary    Left ventricular systolic function is normal. Calculated left ventricular EF = 63.5% Left ventricular ejection fraction appears to be 61 - 65%.    Left ventricular diastolic function is consistent with (grade I) impaired relaxation.    Saline test results are negative.    Estimated right ventricular systolic pressure from tricuspid regurgitation is normal (<35 mmHg).      Results: Reviewed.    I reviewed the patient's new laboratory and imaging results.  I independently reviewed the patient's new images.    Medications: Reviewed.    Assessment   A/P     Hospital:  LOS: 1 day   ICU: 23h     Active Hospital Problems    Diagnosis  POA    **AIS R MCA [I63.511]  Yes     Jeremias is a 68 y.o. male admitted on 2/2/2024 with Dysarthria, left-sided weakness    Assessment/Management/Treatment Plan:    R/O Subacute infarct Right Frontal Lobe/R MCA territory.  Focal short segment 99% stenosis vs  occlusion of the distal R M1 MCA Segment.  Moderate chronic small vessel ischemic disease.  Chronic lacunar infarct left basal ganglia  DAPT as per Stroke Team  High grade stenosis of the basilar artery without evidence of occlusion.  Left carotid disease. Plaque. 55% stenosis as per CTA Neck  Cardiovascular  HTN. He has been off his medicines for about 2 months. Spot  checks showed a SBP ~ 170 mm Hg.  Dyslipidemia   Quit smoking 2 years ago. But using other nicotine products.  Endocrine  Body mass index is 28.01 kg/m². Overweight: 25.0-29.9kg/m2   Prediabetes (A1C 5.7%-6.4%)    Lab Results   Lab Value Date/Time    HGBA1C 6.1 (H) 02/25/2014 1445     Results from last 7 days   Lab Units 02/03/24  1105 02/03/24  0723 02/02/24  1955 02/02/24  1656 02/02/24  1252   GLUCOSE mg/dL 189* 158* 175* 129 178*         Diet: Diet: Cardiac Diets; Healthy Heart (2-3 Na+); Texture: Regular Texture (IDDSI 7); Fluid Consistency: Thin (IDDSI 0)  No active supplement orders      Advance Directives: Code Status and Medical Interventions:   Ordered at: 02/02/24 1856     Code Status (Patient has no pulse and is not breathing):    CPR (Attempt to Resuscitate)     Medical Interventions (Patient has pulse or is breathing):    Full Support        DVT prophylaxis:  Mechanical DVT prophylaxis orders are present.         In brief:    Acute ischemic stroke  Monitor NIHSS  Monitor for arrhythmias  ECHO: EF 63.5% Saline test: Negative  Carotid Ultrasound: Pending  High-intensity statin - Long term goal LDL < 70.  Goal: Glucose < 180 mg/dL.  PT/OT/SLT  Discussed with Dr. Fior Flores (Vascular Neurologist)   Discussed with patient and family at bedside.  Disposition: Transfer to Telemetry Unit / Home soon, awaiting Stroke Team recommendations  Follow up Stroke Clinic 1-3 months    Plan of care and goals reviewed during interdisciplinary rounds.  I discussed the patient's findings and my recommendations with patient, family, and nursing staff    MDM:    Problem(s) High due to: Acute or Chronic illness or injury that may poses a threat to life or bodily function  Data: Moderate due to: Review of prior external records from each unique source, Review or results of each unique test, and Ordering of each unique test    Moderate    [x] Primary Attending Intensive Care Medicine - Nutrition Support   [] Consultant

## 2024-02-03 NOTE — DISCHARGE SUMMARY
DISCHARGE SUMMARY    Patient Name: Jeremias Gallegos  : 1955  MRN: [id number]    Date of Admission: 2024  3:32 PM  Date of Discharge: 2/3/2024  6:40 PM   Primary Care Physician: Nagi Rodriguez MD    Reason for hospitalization     HPI:  Jeremias Gallegos is a 68 y.o. male was admitted on 2024 with the initial diagnosis of CVA (cerebral vascular accident) [I63.9]  TIA (transient ischemic attack) [G45.9].    Mr. Gallegos was transported via [facility] via EMS from [facility] for evaluation of sudden-onset slurred speech, left facial droop, left arm weakness, and aphasia beginning at approximately 1230 on 24. He was taken to [facility] by family/friends. At OSH, initial /114. CTA showed a right M1 segment stenosis (99%) with CTP demonstrating right MCA penumbra of 158 mL with mismatch ratio 12.3. Initial NIHSS at OSH 4. Non-contrast CT head showed right frontal hypodensity making him not a candidate for IV thrombolytics. He was transferred to [facility] for higher level of care.     He has a history of prior tobacco use and current smokeless tobacco use, hypertension, dyslipidemia, and GERD. He has not seen a doctor in more than 2 years.    Significant findings.  Discharge diagnosis/problems:     Active Hospital Problems    Diagnosis    **AIS R MCA [I63.511]        Physical Examination and Data     Vitals:  Temp: 98.2 °F (36.8 °C) (24 0800) Temp  Min: 97.7 °F (36.5 °C)  Max: 98.2 °F (36.8 °C)   Temp core:      BP: 111/94 (24 1800) BP  Min: 111/94  Max: 158/95   Pulse: 92 (24 1800) Pulse  Min: 57  Max: 92   Resp: 16 (24 0800) Resp  Min: 16  Max: 16   SpO2: 93 % (24 1800) SpO2  Min: 88 %  Max: 96 %   Device: room air (24 0400)    Flow Rate:   No data recorded     Physical Examination  Telemetry:  Rhythm: normal sinus rhythm (02/03/24 1400)   Constitutional:  No acute distress.   Cardiovascular: RRR.    Respiratory: Normal breath sounds  No adventitious sounds   Abdominal:  Soft  with no tenderness.   Extremities: No Edema   Neurological:             Alert, Oriented, Cooperative.  Best Eye Response: 4-->(E4) spontaneous (02/03/24 1400)  Best Motor Response: 6-->(M6) obeys commands (02/03/24 1400)  Best Verbal Response: 5-->(V5) oriented (02/03/24 1400)  [place] Coma Scale Score: 15 (02/03/24 1400)      Total (NIH Stroke Scale): 0 (02/03/24 0700)     Results Reviewed:  Laboratory  Microbiology  Radiology  Pathology    Hematology:  Results from last 7 days   Lab Units 02/03/24  0617 02/02/24  1310   WBC 10*3/mm3 10.57 10.21   HEMOGLOBIN g/dL 17.7 18.2*   MCV fL 92.6 93.0   PLATELETS 10*3/mm3 193 215     Results from last 7 days   Lab Units 02/03/24  0617 02/02/24  1310   NEUTROS ABS 10*3/mm3 7.10* 6.52   LYMPHS ABS 10*3/mm3 2.01 2.46   EOS ABS 10*3/mm3 0.26 0.19     Chemistry:  Estimated Creatinine Clearance: 102.2 mL/min (by C-G formula based on SCr of 0.82 mg/dL).    Results from last 7 days   Lab Units 02/03/24  0617 02/02/24  1310   SODIUM mmol/L 138 138   POTASSIUM mmol/L 4.1 4.4   CHLORIDE mmol/L 104 101   CO2 mmol/L 20.0* 26.7   BUN mg/dL 10 8   CREATININE mg/dL 0.82 1.00  1.01   GLUCOSE mg/dL 164* 190*     Results from last 7 days   Lab Units 02/03/24  0617 02/02/24  1310   CALCIUM mg/dL 8.9 9.4   MAGNESIUM mg/dL 2.2  --    PHOSPHORUS mg/dL 2.8  --        Hepatic Panel:  Results from last 7 days   Lab Units 02/03/24  0617 02/02/24  1310   ALBUMIN g/dL 3.9 4.3   TOTAL PROTEIN g/dL 6.5 7.3   BILIRUBIN mg/dL 0.6 0.6   AST (SGOT) U/L 23 27   ALT (SGPT) U/L 33 40   ALK PHOS U/L 79 94       Coagulation Labs:  Results from last 7 days   Lab Units 02/02/24  1310   PROTIME Seconds 13.0   INR  0.94   APTT seconds 28.1        Cardiac Labs:  Results from last 7 days   Lab Units 02/02/24  1310   HSTROP T ng/L 8     COVID-19  Lab Results   Component Value Date    COVID19 Not Detected 02/02/2024     Images:  MRI Brain Without Contrast    Result Date: 2/3/2024  Impression: There are small sites of  restricted diffusion in the right MCA distribution largest involving the frontal lobe. This is consistent with acute to subacute infarct. Moderate amount of hyperintense FLAIR and T2 signal intensity in the periventricular subcortical white matter. This is nonspecific, but most commonly seen with chronic small vessel ischemic change. Electronically Signed: Claudia Curtis MD  2/3/2024 8:59 AM EST  Workstation ID: [id number]    XR Chest 1 View    Result Date: 2/2/2024    Unremarkable exam. No acute cardiopulmonary findings identified.   This report was finalized on 2/2/2024 2:22 PM by Dr. Bubba Lopez MD.      CT Angiogram Head w AI Analysis of LVO    Result Date: 2/2/2024  1.  Focal short segment 99% stenosis versus occlusion of the distal right M1 MCA segment.  Flow is noted distal to this. 2.  A small focal high-grade stenotic lesion of right M2 MCA segment. 3.  Short segment high-grade stenosis basilar artery at the level of petrous apex. 4.  Moderate calcifications intracranial right ICA segment without high-grade stenosis. Moderate stenosis clinoid segment. 5.  Moderate diffuse plaque of the intracranial left ICA segments with moderate stenosis clinoid segment. No high-grade stenosis.   This report was finalized on 2/2/2024 2:04 PM by Dr. Bubba Lopez MD.      CT CEREBRAL PERFUSION WITH & WITHOUT CONTRAST    Result Date: 2/2/2024  Acute brain perfusion abnormality in the right MCA territory with penumbra volume: 158 mL. Mismatch ratio: 12.3   This report was finalized on 2/2/2024 2:02 PM by Dr. Bubba Lopez MD.      CT Angiogram Neck    Result Date: 2/2/2024  1.  Focal short segment high-grade stenosis of the basilar artery without evidence of occlusion. 2.  There is advanced plaque of the proximal left ICA and carotid bulb with moderate 55% medium segment stenosis. No high-grade stenosis or occlusion. 3.  Moderate plaque origin of left CCA with moderate 50% stenosis short segment. 4.  Aberrant origin of the  right subclavian artery coursing posterior to the esophagus. 5. Other incidental and nonacute findings detailed above.   This report was finalized on 2/2/2024 1:57 PM by Dr. Bubba Lopez MD.      CT Head Without Contrast Stroke Protocol    Result Date: 2/2/2024  1.  Subacute appearing infarct in the right frontal lobe without edema or mass effect. 2.  No intracranial hemorrhage. 3.  Moderate chronic small vessel ischemic disease. 4.  Chronic lacunar infarct left basal ganglionic region.   This report was finalized on 2/2/2024 1:17 PM by Dr. Bubba Lopez MD.       Echo:  Results for orders placed during the hospital encounter of 02/02/24    Adult Transthoracic Echo Complete W/ Cont if Necessary Per Protocol (With Agitated Saline)    Interpretation Summary    Left ventricular systolic function is normal. Calculated left ventricular EF = 63.5% Left ventricular ejection fraction appears to be 61 - 65%.    Left ventricular diastolic function is consistent with (grade I) impaired relaxation.    Saline test results are negative.    Estimated right ventricular systolic pressure from tricuspid regurgitation is normal (<35 mmHg).      Results: Reviewed.  I reviewed the patient's new laboratory and imaging results.  I independently reviewed the patient's new images.    Medications: Reviewed.    Hospital Course, Consults and Procedures  provided:   Mr. Gallegos had an uncomplicated clinical course. NIHSS upon [facility] arrival was 1 due to mild, left-sided sensory loss. He was monitored in the ICU. He was started on DAPT and statin.    MRI brain on 2/3/24 showed small sites of restricted diffusion in the right MCA distribution in the frontal lobe, consistent with acute to subacute infarct and chronic small vessel ischemic changes in the periventricular subcortical white matter.     PT/OT worked with the patient with no DME required as patient returned to baseline for ADL completion.     He passed a bedside dysphagia evaluation and  was piedad to tolerate a regular, heart healthy diet. Patient and family declined SLP evaluation for language and cognition.    Diabetes education was consulted (A1c 6.1) with no role for diabetes education noted at this time.    Echocardiogram on 2/4/24 demonstrated normal left ventricular function with EF 61-65%, diastolic function with grade I impaired relaxation, and negative saline test results. Carotid duplex showed less than 50% plaque bilaterally.     As of 2/4/23, patient felt back to his neurologic baseline and has been deemed appropriate for discharge home by Neurology and Intensivist. He will be discharged on DAPT with follow-up in Stroke Clinic in 1-3 months. Right M1 segment stent may be considered if patient failed maximal medical therapy.    Consults:  Consults       Date and Time Order Name Status Description    2/2/2024  3:34 PM Inpatient Neurology Consult Stroke            Condition on discharge     Good. (Vital signs within normal limits. Patient conscious and comfortable)    Patient and family instructions     Discharge Disposition: Home or Self Care    CODE STATUS:   Code Status and Medical Interventions:   Ordered at: 02/02/24 1856     Code Status (Patient has no pulse and is not breathing):    CPR (Attempt to Resuscitate)     Medical Interventions (Patient has pulse or is breathing):    Full Support       No Known Allergies    Discharge Medications:     Discharge Medications        New Medications        Instructions Start Date   aspirin 81 MG chewable tablet   81 mg, Oral, Daily   Start Date: February 4, 2024     clopidogrel 75 MG tablet  Commonly known as: PLAVIX   75 mg, Oral, Daily   Start Date: February 4, 2024     Influenza Vac High-Dose Quad 0.7 ML suspension prefilled syringe injection  Commonly known as: FLUZONE HIGH DOSE   0.7 mL, Intramuscular, During Hospitalization             Changes to Medications        Instructions Start Date   rosuvastatin 40 MG tablet  Commonly known as:  AREN  What changed:   medication strength  how much to take  when to take this   40 mg, Oral, Nightly   Start Date: February 4, 2024            Stop These Medications      metoprolol succinate XL 25 MG 24 hr tablet  Commonly known as: TOPROL-XL              Activity: As tolerated       PT/OT/ST orders: N/A     Diet: Cardiac Diets; Healthy Heart (2-3 Na+); Texture: Regular Texture (IDDSI 7); Fluid Consistency: Thin (IDDSI 0)               Time Spent on Discharge:    I spent  20  minutes on this discharge activity which included: face-to-face encounter with the patient, reviewing the data in the system, coordination of the care with the nursing staff as well as consultants, documentation, and entering orders.

## 2024-02-03 NOTE — SIGNIFICANT NOTE
02/03/24 1035   SLP Deferred Reason   SLP Deferred Reason Patient/family declined evaluation  (Neurologist with pt/fam, they all confirm he is at his baseline with Speech, language, and cognition.Passed swallow screening. Please reconsult if further needs arise.)

## 2024-02-04 NOTE — OUTREACH NOTE
Prep Survey      Flowsheet Row Responses   [facility] facility patient discharged from? [place]   Is LACE score < 7 ? Yes   Eligibility Readm Mgmt   Discharge diagnosis CVA (cerebral vascular accident) (I63 TIA (transient ischemic attack   Does the patient have one of the following disease processes/diagnoses(primary or secondary)? Stroke   Does the patient have Home health ordered? No   Is there a DME ordered? No   Prep survey completed? Yes            CAROLINA AREVALO - Registered Nurse

## 2024-02-05 ENCOUNTER — READMISSION MANAGEMENT (OUTPATIENT)
Dept: CALL CENTER | Facility: HOSPITAL | Age: 69
End: 2024-02-05
Payer: COMMERCIAL

## 2024-02-05 NOTE — OUTREACH NOTE
Stroke Week 1 Survey      Flowsheet Row Responses   [facility] facility patient discharged from? [place]   Does the patient have one of the following disease processes/diagnoses(primary or secondary)? Stroke   Week 1 attempt successful? No   Unsuccessful attempts Attempt 1            Ashley ROMAN - Registered Nurse  
Anemia    Asthma    Dyspnea    Herniated disc, cervical    Neuropathy  from neck/lower back, arms and legs  Thyromegaly

## 2024-02-05 NOTE — CONSULTS
Not a candidate for the stroke/diabetes class as no dx of dm made at this visit in provider notes. No recent A1c available.

## 2024-02-06 ENCOUNTER — READMISSION MANAGEMENT (OUTPATIENT)
Dept: CALL CENTER | Facility: HOSPITAL | Age: 69
End: 2024-02-06
Payer: COMMERCIAL

## 2024-02-06 NOTE — OUTREACH NOTE
Stroke Week 1 Survey      Flowsheet Row Responses   [facility] patient discharged from? [place]   Does the patient have one of the following disease processes/diagnoses(primary or secondary)? Stroke   Week 1 attempt successful? Yes   Call start time 1040   Call end time 1044   Discharge diagnosis CVA (cerebral vascular accident) (I63 TIA (transient ischemic attack   Meds reviewed with patient/caregiver? Yes   Is the patient having any side effects they believe may be caused by any medication additions or changes? No   Does the patient have all medications ordered at discharge? Yes   Is the patient taking all medications as directed (includes completed medication regime)? Yes   Does the patient have a primary care provider?  Yes   Has the patient kept scheduled appointments due by today? Yes  [2/5/24]   Has home health visited the patient within 72 hours of discharge? N/A   Does the patient require any assistance with activities of daily living such as eating, bathing, dressing, walking, etc.? No   Does the patient have any residual symptoms from stroke/TIA? No   Does the patient understand the diet ordered at discharge? Yes   Did the patient receive a copy of their discharge instructions? Yes   Nursing interventions Reviewed instructions with patient   What is the patient's perception of their health status since discharge? Improving   Nursing interventions Nurse provided patient education   Is the patient/caregiver able to teach back the risk factors for a stroke? High blood pressure-goal below 120/80, Physical inactivity and obesity, History of TIAs   Is the patient/caregiver able to teach back signs and symptoms related to disease process for when to call PCP? Yes   Is the patient/caregiver able to teach back signs and symptoms related to disease process for when to call 911? Yes   If the patient is a current smoker, are they able to teach back resources for cessation? Not a smoker   Is the  patient/caregiver able to teach back the hierarchy of who to call/visit for symptoms/problems? PCP, Specialist, Home health nurse, Urgent Care, ED, 911 Yes   Is the patient able to teach back FAST for Stroke? B alance: Watch for sudden loss of balance, E yes: Check for vision loss, F ace: Look for an uneven smile, A rm: Check if one arm is weak, S peech: Listen for slurred speech, T keith: Call 9-1-1 right away   Week 1 call completed? Yes   Revoked No further contact(revokes)-requires comment   Graduated/Revoked comments Pt improving-pt was transferred to the stroke clinic to schedule an apt   Call end time 1044            Mable H - Registered Nurse

## 2024-05-08 ENCOUNTER — OFFICE VISIT (OUTPATIENT)
Dept: NEUROLOGY | Facility: CLINIC | Age: 69
End: 2024-05-08
Payer: MEDICARE

## 2024-05-08 VITALS
BODY MASS INDEX: 27.27 KG/M2 | OXYGEN SATURATION: 97 % | SYSTOLIC BLOOD PRESSURE: 124 MMHG | WEIGHT: 194.8 LBS | DIASTOLIC BLOOD PRESSURE: 84 MMHG | HEART RATE: 93 BPM | HEIGHT: 71 IN

## 2024-05-08 DIAGNOSIS — I10 PRIMARY HYPERTENSION: ICD-10-CM

## 2024-05-08 DIAGNOSIS — R73.03 PREDIABETES: ICD-10-CM

## 2024-05-08 DIAGNOSIS — I67.2 INTRACRANIAL ATHEROSCLEROSIS: Primary | ICD-10-CM

## 2024-05-08 DIAGNOSIS — F17.200 TOBACCO USE DISORDER: ICD-10-CM

## 2024-05-08 DIAGNOSIS — E78.2 MIXED HYPERLIPIDEMIA: ICD-10-CM

## 2024-05-08 DIAGNOSIS — Z86.73 HISTORY OF STROKE: ICD-10-CM

## 2024-05-08 PROCEDURE — 1160F RVW MEDS BY RX/DR IN RCRD: CPT | Performed by: NURSE PRACTITIONER

## 2024-05-08 PROCEDURE — 1159F MED LIST DOCD IN RCRD: CPT | Performed by: NURSE PRACTITIONER

## 2024-05-08 PROCEDURE — 99214 OFFICE O/P EST MOD 30 MIN: CPT | Performed by: NURSE PRACTITIONER

## 2024-05-08 RX ORDER — AMOXICILLIN 875 MG/1
875 TABLET, COATED ORAL 2 TIMES DAILY
COMMUNITY
Start: 2024-04-25

## 2024-05-08 RX ORDER — LISINOPRIL 10 MG/1
10 TABLET ORAL DAILY
COMMUNITY
Start: 2024-04-19

## 2024-05-23 ENCOUNTER — HOSPITAL ENCOUNTER (OUTPATIENT)
Dept: CT IMAGING | Facility: HOSPITAL | Age: 69
Discharge: HOME | End: 2024-05-23
Admitting: NURSE PRACTITIONER
Payer: MEDICARE

## 2024-05-23 DIAGNOSIS — I67.2 INTRACRANIAL ATHEROSCLEROSIS: ICD-10-CM

## 2024-05-23 DIAGNOSIS — Z86.73 HISTORY OF STROKE: ICD-10-CM

## 2024-05-23 PROCEDURE — 25510000001 IOPAMIDOL 61 % SOLUTION: Performed by: NURSE PRACTITIONER

## 2024-05-23 PROCEDURE — 70496 CT ANGIOGRAPHY HEAD: CPT

## 2024-05-23 RX ADMIN — IOPAMIDOL 80 ML: 612 INJECTION, SOLUTION INTRAVENOUS at 09:37

## 2024-08-23 NOTE — PROGRESS NOTES
Follow Up Office Visit      Encounter Date: 2024   Patient Name: Jeremias Gallegos  : 1955   MRN: [id number]   PCP: Rani Geronimo APRN    Chief Complaint:    Chief Complaint   Patient presents with    Stroke     Follow up       History of Present Illness: Jeremias Gallegos is a 69 y.o. male with known medical diagnoses of hypertension, hyperlipidemia, CAD, PVD, tobacco use, and recent stroke presents today for follow up.  Initially presented to [facility] on 2024 with slurred speech, left facial droop, left arm weakness, and aphasia.  Initial CT head showed right frontal hypodensity, CTA head/neck with right M1 segment stenosis at 99%.  CTP with perfusion deficit in right frontal area.  Not a candidate for TNK due to changes noted on CT head.  He was transferred to [facility] for higher level of care.  He was previously on daily baby aspirin.  Discharge NIH was down to 1 for sensory deficit.  TTE with normal LVEF, negative saline test.  Carotid duplex showed less than 50% plaque bilaterally.  Recommended that he be on dual antiplatelets with aspirin and Plavix for at least 90 days to be followed by aspirin monotherapy along with high intensity statin for secondary stroke prevention.     OV 2024: Today he reports being at his pre stroke baseline.  Except he has noticed some minor issues with short term memory.  He will occasionally forget appointments, etc. He denies any numbness/weakness. He is compliant with medication regimen, no issues with cost.  He denies any new stroke/TIA symptoms. States he has returned to all of his previous activities. Had new diagnosis of prediabetes, has lost about 15 pounds since hospital admission.    OV 2024: Today he remains at his prestroke baseline.  He is compliant with his medication regimen, no issues with cost.  He denies any new stroke/TIA symptoms.  He has returned to his previous level activity.      Subjective     "    I have reviewed and the following portions of the patient's history were updated as appropriate: past family history, past medical history, past social history, past surgical history and problem list.    Medications:     Current Outpatient Medications:     amoxicillin (AMOXIL) 875 MG tablet, Take 1 tablet by mouth 2 (Two) Times a Day., Disp: , Rfl:     aspirin 81 MG chewable tablet, Chew 1 tablet Daily., Disp: 90 tablet, Rfl: 0    clopidogrel (PLAVIX) 75 MG tablet, Take 1 tablet by mouth Daily., Disp: 30 tablet, Rfl: 1    Influenza Vac High-Dose Quad (FLUZONE HIGH DOSE) 0.7 ML suspension prefilled syringe injection, Inject 0.7 mL into the appropriate muscle as directed by prescriber During Hospitalization (Influenza) for up to 1 dose., Disp: 0.7 mL, Rfl: 0    lisinopril (PRINIVIL,ZESTRIL) 10 MG tablet, Take 1 tablet by mouth Daily., Disp: , Rfl:     rosuvastatin (CRESTOR) 40 MG tablet, Take 1 tablet by mouth Every Night., Disp: 90 tablet, Rfl: 0    Allergies:   No Known Allergies    Review of Systems   Constitutional:  Negative for chills and fever.   Eyes:  Positive for visual disturbance (Chronic vision issues).   Respiratory: Negative.     Cardiovascular: Negative.    Musculoskeletal:  Positive for arthralgias.   Skin: Negative.    Neurological: Negative.    Psychiatric/Behavioral: Negative.          Objective     Physical Exam:   Vital Signs:   Vitals:    08/28/24 1408   BP: 133/70   BP Location: Right arm   Patient Position: Sitting   Cuff Size: Adult   Pulse: 73   SpO2: 96%   Weight: 89 kg (196 lb 3.2 oz)   Height: 177.8 cm (70\")     Body mass index is 28.15 kg/m².    Physical Exam  Nursing note reviewed.   Constitutional:       General: He is awake. He is not in acute distress.     Appearance: Normal appearance. He is not ill-appearing.   HENT:      Head: Normocephalic.      Mouth/Throat:      Mouth: Mucous membranes are moist.      Pharynx: Oropharynx is clear.   Eyes:      General: Lids are normal.      " Extraocular Movements: Extraocular movements intact.      Pupils: Pupils are equal, round, and reactive to light.   Cardiovascular:      Rate and Rhythm: Normal rate and regular rhythm.   Pulmonary:      Effort: Pulmonary effort is normal. No respiratory distress.   Skin:     General: Skin is warm and dry.   Neurological:      Mental Status: He is alert.      Motor: Motor strength is normal.  Psychiatric:         Mood and Affect: Mood normal.         Speech: Speech normal.         Behavior: Behavior normal.       Neurological Exam  Mental Status  Awake and alert. Oriented to person, place and time. Recent and remote memory are intact. Speech is normal. Language is fluent with no aphasia. Attention and concentration are normal. Fund of knowledge is appropriate for level of education.    Cranial Nerves  CN II: Visual fields full to confrontation.  CN III, IV, VI: Extraocular movements intact bilaterally. Normal lids and orbits bilaterally. Pupils equal round and reactive to light bilaterally.  CN V: Facial sensation is normal.  CN VII: Full and symmetric facial movement.  CN IX, X: Palate elevates symmetrically  CN XI: Shoulder shrug strength is normal.  CN XII: Tongue midline without atrophy or fasciculations.    Motor  Normal muscle bulk throughout. Normal muscle tone. No abnormal involuntary movements. Strength is 5/5 throughout all four extremities.    Sensory  Light touch is normal in upper and lower extremities.     Coordination  Right: Finger-to-nose normal. Heel-to-shin normal.Left: Finger-to-nose normal. Heel-to-shin normal.    Gait  Casual gait is normal including stance, stride, and arm swing.       Procedures    NIH Stroke Scale    1a  Level of consciousness: 0=alert; keenly responsive   1b. LOC questions:  0=Answers both questions correctly    1c. LOC commands: 0=Performs both tasks correctly   2.  Best Gaze: 0=normal   3. Visual: 0=No visual loss   4. Facial Palsy: 0=Normal symmetric movement   5a. Motor  left arm: 0=No drift, limb holds 90 (or 45) degrees for full 10 seconds   5b.  Motor right arm: 0=No drift, limb holds 90 (or 45) degrees for full 10 seconds   6a. Motor left le=No drift, limb holds 90 (or 45) degrees for full 10 seconds   6b  Motor right le=No drift, limb holds 90 (or 45) degrees for full 10 seconds   7. Limb Ataxia: 0=Absent   8.  Sensory: 0=Normal; no sensory loss   9. Best Language:  0=No aphasia, normal   10. Dysarthria: 0=Normal   11. Extinction and Inattention: 0=No abnormality    Total:   0         Modified [place] Scale: 0          0  No Symptoms    1 No significant disability. Able to carry out all usual activities, despite some symptoms.    2 Slight disability. Able to look after own affairs without assistance, but unable to carry out all previous activities.    3 Moderate disability. Requires some help, but able to walk unassisted.    4 Moderately severe disability. Unable to attend to own bodily needs without assistance, and unable to walk unassisted.    5 Severe disability. Requires constant nursing care and attention, bedridden, incontinent.    6 Dead          PHQ-9 Depression Screening  Little interest or pleasure in doing things? 0-->not at all   Feeling down, depressed, or hopeless? 0-->not at all   Trouble falling or staying asleep, or sleeping too much?     Feeling tired or having little energy?     Poor appetite or overeating?     Feeling bad about yourself - or that you are a failure or have let yourself or your family down?     Trouble concentrating on things, such as reading the newspaper or watching television?     Moving or speaking so slowly that other people could have noticed? Or the opposite - being so fidgety or restless that you have been moving around a lot more than usual?     Thoughts that you would be better off dead, or of hurting yourself in some way?     PHQ-9 Total Score 0   If you checked off any problems, how difficult have these problems made it for  "you to do your work, take care of things at home, or get along with other people?        Imaging Resluts:     No radiology results for the last 90 days.     Laboratory Results:    Lab Results   Component Value Date    HGBA1C 6.1 (H) 02/25/2014     Lab Results   Component Value Date    WBC 10.57 02/03/2024    HGB 17.7 02/03/2024    HCT 51.0 02/03/2024    MCV 92.6 02/03/2024     02/03/2024      Lab Results   Component Value Date    GLUCOSE 164 (H) 02/03/2024    BUN 10 02/03/2024    CREATININE 0.82 02/03/2024    BCR 12.2 02/03/2024    K 4.1 02/03/2024    CO2 20.0 (L) 02/03/2024    CALCIUM 8.9 02/03/2024    ALBUMIN 3.9 02/03/2024    AST 23 02/03/2024    ALT 33 02/03/2024      Lab Results   Component Value Date    CHOL 192 02/03/2024     Lab Results   Component Value Date    TRIG 207 (H) 02/03/2024     Lab Results   Component Value Date    HDL 28 (L) 02/03/2024     Lab Results   Component Value Date     (H) 02/03/2024      No results found for: \"XIWSITML87\"  No results found for: \"FOLATE\"  Lab Results   Component Value Date    TSH 2.190 02/03/2024       Assessment / Plan      Assessment/Plan:   Diagnoses and all orders for this visit:    1. History of stroke (Primary)    2. Mixed hyperlipidemia    3. Intracranial atherosclerosis    4. Primary hypertension    5. Tobacco use disorder    6. Prediabetes       -Continue dual antiplatelets with ASA/Plavix likely lifelong for Dr. Haddad due to the stenosis   -Repeat CTA head again showed narrowing at distal right M1 segment but no occlusion along with narrowing of the basilar tip without occlusion  -BP today 133/70, he does not routinely check it at home  -Reports having labs drawn 2 weeks ago, has not yet followed up with PCP  -Continue rosuvastatin 40 mg daily  -He quit smoking some time ago, then was using smokeless tobacco, now is using nicotine pouches.  Encouraged complete nicotine cessaiton  -Consider right M1 stent placement if patient fails max medical " therapy  -Repeat CTAs at next follow-up  -He follow-up with ophthalmology and received a new prescription, feels his vision has improved since the stroke    Discussed the importance of medication compliance and lifestyle modifications (adequate blood pressure control, adequate control of hyperlipidemia, adequate glycemic control, increase physical activity, and healthy diet) to help reduce the risk of future cerebrovascular events.  Also discussed the signs symptoms that would warrant the patient return back to the emergency department including unilateral weakness, unilateral numbness, visual disturbances, loss of balance, speech difficulties, and/or a sudden severe headache.      Blood Pressure control to < 130/80  Goal LDL <70-recommend high dose statins  Goal A1c < 7.0  Call 911 for any stroke symptoms    Follow Up:   Return in about 1 year (around 8/28/2025).    [facility]    This document has been electronically signed by THA Kaur  August 28, 2024 15:08 EDT    Please note that portions of this note were completed with a voice recognition program. Efforts were made to edit dictation, but occasionally words are mistranscribed.

## 2024-08-28 ENCOUNTER — OFFICE VISIT (OUTPATIENT)
Dept: NEUROLOGY | Facility: CLINIC | Age: 69
End: 2024-08-28
Payer: MEDICARE

## 2024-08-28 VITALS
DIASTOLIC BLOOD PRESSURE: 70 MMHG | HEIGHT: 70 IN | OXYGEN SATURATION: 96 % | HEART RATE: 73 BPM | WEIGHT: 196.2 LBS | BODY MASS INDEX: 28.09 KG/M2 | SYSTOLIC BLOOD PRESSURE: 133 MMHG

## 2024-08-28 DIAGNOSIS — E78.2 MIXED HYPERLIPIDEMIA: ICD-10-CM

## 2024-08-28 DIAGNOSIS — I67.2 INTRACRANIAL ATHEROSCLEROSIS: ICD-10-CM

## 2024-08-28 DIAGNOSIS — I10 PRIMARY HYPERTENSION: ICD-10-CM

## 2024-08-28 DIAGNOSIS — R73.03 PREDIABETES: ICD-10-CM

## 2024-08-28 DIAGNOSIS — F17.200 TOBACCO USE DISORDER: ICD-10-CM

## 2024-08-28 DIAGNOSIS — Z86.73 HISTORY OF STROKE: Primary | ICD-10-CM

## 2024-08-28 PROCEDURE — 99213 OFFICE O/P EST LOW 20 MIN: CPT | Performed by: NURSE PRACTITIONER

## 2025-08-29 ENCOUNTER — OFFICE VISIT (OUTPATIENT)
Dept: NEUROLOGY | Facility: CLINIC | Age: 70
End: 2025-08-29
Payer: MEDICARE

## 2025-08-29 VITALS
HEART RATE: 73 BPM | OXYGEN SATURATION: 96 % | DIASTOLIC BLOOD PRESSURE: 62 MMHG | BODY MASS INDEX: 26.92 KG/M2 | WEIGHT: 187.6 LBS | SYSTOLIC BLOOD PRESSURE: 118 MMHG

## 2025-08-29 DIAGNOSIS — E78.2 MIXED HYPERLIPIDEMIA: ICD-10-CM

## 2025-08-29 DIAGNOSIS — R73.03 PREDIABETES: ICD-10-CM

## 2025-08-29 DIAGNOSIS — I10 PRIMARY HYPERTENSION: ICD-10-CM

## 2025-08-29 DIAGNOSIS — I67.2 INTRACRANIAL ATHEROSCLEROSIS: ICD-10-CM

## 2025-08-29 DIAGNOSIS — Z86.73 HISTORY OF STROKE: Primary | ICD-10-CM
